# Patient Record
Sex: MALE | Race: WHITE | Employment: OTHER | URBAN - METROPOLITAN AREA
[De-identification: names, ages, dates, MRNs, and addresses within clinical notes are randomized per-mention and may not be internally consistent; named-entity substitution may affect disease eponyms.]

---

## 2017-10-16 ENCOUNTER — OFFICE VISIT (OUTPATIENT)
Dept: INTERNAL MEDICINE CLINIC | Age: 67
End: 2017-10-16

## 2017-10-16 ENCOUNTER — HOSPITAL ENCOUNTER (OUTPATIENT)
Dept: LAB | Age: 67
Discharge: HOME | End: 2017-10-16
Payer: MEDICARE

## 2017-10-16 VITALS
DIASTOLIC BLOOD PRESSURE: 93 MMHG | TEMPERATURE: 97.4 F | RESPIRATION RATE: 17 BRPM | BODY MASS INDEX: 29.34 KG/M2 | WEIGHT: 221.4 LBS | OXYGEN SATURATION: 96 % | SYSTOLIC BLOOD PRESSURE: 155 MMHG | HEART RATE: 59 BPM | HEIGHT: 73 IN

## 2017-10-16 DIAGNOSIS — J30.89 CHRONIC NON-SEASONAL ALLERGIC RHINITIS, UNSPECIFIED TRIGGER: ICD-10-CM

## 2017-10-16 DIAGNOSIS — K21.9 GASTROESOPHAGEAL REFLUX DISEASE, ESOPHAGITIS PRESENCE NOT SPECIFIED: ICD-10-CM

## 2017-10-16 DIAGNOSIS — Z23 ENCOUNTER FOR IMMUNIZATION: ICD-10-CM

## 2017-10-16 DIAGNOSIS — E78.00 PURE HYPERCHOLESTEROLEMIA: ICD-10-CM

## 2017-10-16 DIAGNOSIS — H04.129 DRY EYE: ICD-10-CM

## 2017-10-16 DIAGNOSIS — I10 ESSENTIAL HYPERTENSION: Primary | ICD-10-CM

## 2017-10-16 PROCEDURE — 80053 COMPREHEN METABOLIC PANEL: CPT

## 2017-10-16 PROCEDURE — 80061 LIPID PANEL: CPT

## 2017-10-16 RX ORDER — CARBOXYMETHYLCELLULOSE SODIUM 10 MG/ML
GEL OPHTHALMIC
COMMUNITY
End: 2020-12-04 | Stop reason: ALTCHOICE

## 2017-10-16 RX ORDER — ZOLPIDEM TARTRATE 10 MG/1
5 TABLET ORAL
COMMUNITY
End: 2017-12-18 | Stop reason: DRUGHIGH

## 2017-10-16 RX ORDER — FLUTICASONE PROPIONATE 50 MCG
2 SPRAY, SUSPENSION (ML) NASAL DAILY
COMMUNITY
End: 2020-04-30 | Stop reason: ALTCHOICE

## 2017-10-16 RX ORDER — DEXLANSOPRAZOLE 60 MG/1
CAPSULE, DELAYED RELEASE ORAL
COMMUNITY
End: 2017-12-18 | Stop reason: SDUPTHER

## 2017-10-16 RX ORDER — LORATADINE 10 MG/1
10 TABLET ORAL
COMMUNITY
End: 2019-12-11 | Stop reason: ALTCHOICE

## 2017-10-16 RX ORDER — DIPHENHYDRAMINE HCL 25 MG
25 CAPSULE ORAL
COMMUNITY
End: 2018-11-30 | Stop reason: ALTCHOICE

## 2017-10-16 RX ORDER — SERTRALINE HYDROCHLORIDE 100 MG/1
TABLET, FILM COATED ORAL DAILY
COMMUNITY
End: 2017-10-16 | Stop reason: SDUPTHER

## 2017-10-16 RX ORDER — ATENOLOL 50 MG/1
TABLET ORAL DAILY
COMMUNITY
End: 2017-10-16 | Stop reason: SDUPTHER

## 2017-10-16 RX ORDER — DESOXIMETASONE 2.5 MG/G
CREAM TOPICAL 2 TIMES DAILY
COMMUNITY

## 2017-10-16 RX ORDER — SERTRALINE HYDROCHLORIDE 100 MG/1
100 TABLET, FILM COATED ORAL DAILY
Qty: 90 TAB | Refills: 3 | Status: SHIPPED | OUTPATIENT
Start: 2017-10-16 | End: 2017-12-18 | Stop reason: DRUGHIGH

## 2017-10-16 RX ORDER — METRONIDAZOLE 10 MG/G
GEL TOPICAL DAILY
COMMUNITY

## 2017-10-16 RX ORDER — ATORVASTATIN CALCIUM 10 MG/1
10 TABLET, FILM COATED ORAL DAILY
Qty: 90 TAB | Refills: 3 | Status: SHIPPED | OUTPATIENT
Start: 2017-10-16 | End: 2018-12-20 | Stop reason: SDUPTHER

## 2017-10-16 RX ORDER — CLINDAMYCIN PHOSPHATE 10 MG/ML
SOLUTION TOPICAL
COMMUNITY

## 2017-10-16 RX ORDER — LANOLIN ALCOHOL/MO/W.PET/CERES
1000 CREAM (GRAM) TOPICAL DAILY
COMMUNITY

## 2017-10-16 RX ORDER — ATENOLOL 50 MG/1
50 TABLET ORAL DAILY
Qty: 90 TAB | Refills: 3 | Status: SHIPPED | OUTPATIENT
Start: 2017-10-16 | End: 2017-10-17 | Stop reason: RX

## 2017-10-16 RX ORDER — ATORVASTATIN CALCIUM 10 MG/1
TABLET, FILM COATED ORAL DAILY
COMMUNITY
End: 2017-10-16 | Stop reason: SDUPTHER

## 2017-10-16 RX ORDER — AMLODIPINE BESYLATE 5 MG/1
5 TABLET ORAL DAILY
COMMUNITY
End: 2017-10-16 | Stop reason: DRUGHIGH

## 2017-10-16 RX ORDER — AMLODIPINE BESYLATE 10 MG/1
10 TABLET ORAL DAILY
Qty: 90 TAB | Refills: 3 | Status: SHIPPED | OUTPATIENT
Start: 2017-10-16 | End: 2017-12-18 | Stop reason: DRUGHIGH

## 2017-10-16 NOTE — MR AVS SNAPSHOT
Visit Information Date & Time Provider Department Dept. Phone Encounter #  
 10/16/2017 10:40 AM [address], MD Formerly [facility] [phone] [id number] Follow-up Instructions Return in about 8 weeks (around 12/11/2017) for htn. Upcoming Health Maintenance Date Due Hepatitis C Screening 1950 DTaP/Tdap/Td series (1 - Tdap) 9/14/1970 FOBT Q 1 YEAR AGE 50-75 9/14/1999 ZOSTER VACCINE AGE 60> 7/14/2009 GLAUCOMA SCREENING Q2Y 9/14/2014 Pneumococcal 65+ Low/Medium Risk (1 of 2 - PCV13) 9/14/2014 MEDICARE YEARLY EXAM 9/14/2014 INFLUENZA AGE 9 TO ADULT 8/1/2017 Allergies as of 10/16/2017  Review Complete On: 10/16/2017 By: [address], [place]  
 No Known Allergies Current Immunizations  Never Reviewed Name Date Influenza High Dose Vaccine PF  Incomplete Not reviewed this visit You Were Diagnosed With   
  
 Codes Comments Essential hypertension    -  Primary ICD-10-CM: I10 
ICD-9-CM: 401.9 Pure hypercholesterolemia     ICD-10-CM: E78.00 ICD-9-CM: 272.0 Encounter for immunization     ICD-10-CM: F00 ICD-9-CM: V03.89 Vitals BP Pulse Temp Resp Height(growth percentile) Weight(growth percentile) (!) 155/93 (BP 1 Location: Right arm, BP Patient Position: Sitting) (!) 59 97.4 °F (36.3 °C) (Oral) 17 6' 1\" (1.854 m) 221 lb 6.4 oz (100.4 kg) SpO2 BMI Smoking Status 96% 29.21 kg/m2 Never Smoker BMI and BSA Data Body Mass Index Body Surface Area  
 29.21 kg/m 2 2.27 m 2 Preferred Pharmacy Pharmacy Name Phone [address], [address] [facility] [address] [phone] Your Updated Medication List  
  
   
This list is accurate as of: 10/16/17 11:32 AM.  Always use your most recent med list. amLODIPine 10 mg tablet Commonly known as:  Alvarado Uribe Take 1 Tab by mouth daily. atenolol 50 mg tablet Commonly known as:  TENORMIN Take 1 Tab by mouth daily. atorvastatin 10 mg tablet Commonly known as:  LIPITOR Take 1 Tab by mouth daily. BENADRYL 25 mg capsule Generic drug:  diphenhydrAMINE Take 25 mg by mouth every six (6) hours as needed. CALCIUM 600 + D 600-125 mg-unit Tab Generic drug:  calcium-cholecalciferol (d3) Take  by mouth. CLARITIN 10 mg tablet Generic drug:  loratadine Take 10 mg by mouth. clindamycin phosphate 1 % Swab  
by Apply Externally route. cyanocobalamin 1,000 mcg tablet Take 1,000 mcg by mouth daily. desoximetasone 0.25 % topical cream  
Commonly known as:  TOPICORT Apply  to affected area two (2) times a day. DEXILANT 60 mg Cpdb Generic drug:  Dexlansoprazole Take  by mouth. fluticasone 50 mcg/actuation nasal spray Commonly known as:  Lella Solum 2 Sprays by Both Nostrils route daily. METROGEL 1 % topical gel Generic drug:  metroNIDAZOLE Apply  to affected area daily. Use a thin layer to affected areas after washing  
  
 multivitamin, tx-iron-ca-min 9 mg iron-400 mcg Tab tablet Commonly known as:  THERA-M w/ IRON Take 1 Tab by mouth daily. REFRESH LIQUIGEL 1 % Dlgl Generic drug:  carboxymethylcellulose sodium Apply  to eye.  
  
 sertraline 100 mg tablet Commonly known as:  ZOLOFT Take 1 Tab by mouth daily. zolpidem 10 mg tablet Commonly known as:  AMBIEN Take  by mouth nightly as needed for Sleep. Prescriptions Sent to Pharmacy Refills  
 amLODIPine (NORVASC) 10 mg tablet 3 Sig: Take 1 Tab by mouth daily. Class: Normal  
 [facility]: [facility] [address] [place] [address] [facility] Ph #: [phone] Route: Oral  
 atorvastatin (LIPITOR) 10 mg tablet 3 Sig: Take 1 Tab by mouth daily.   
 Class: Normal  
 Pharmacy: [address] [place], [place] [address]Willi [facility] AT [facility] Ph #: [phone] Route: Oral  
 sertraline (ZOLOFT) 100 mg tablet 3 Sig: Take 1 Tab by mouth daily. Class: Normal  
 Pharmacy: [address] [facility] Ph #: [phone] Route: Oral  
 atenolol (TENORMIN) 50 mg tablet 3 Sig: Take 1 Tab by mouth daily. Class: Normal  
 Pharmacy: [address] [facility] Ph #: [phone] Route: Oral  
  
We Performed the Following ADMIN PNEUMOCOCCAL VACCINE [ HCPCS] INFLUENZA VIRUS VACCINE, HIGH DOSE SEASONAL, PRESERVATIVE FREE [61775 CPT(R)] LIPID PANEL [20659 CPT(R)] METABOLIC PANEL, COMPREHENSIVE [51240 CPT(R)] Follow-up Instructions Return in about 8 weeks (around 12/11/2017) for htn. Introducing [facility]! [employer] introduces ShotSpotter patient portal. Now you can access parts of your medical record, email your doctor's office, and request medication refills online. 1. In your internet browser, go to [email or web address] 2. Click on the First Time User? Click Here link in the Sign In box. You will see the New Member Sign Up page. 3. Enter your ShotSpotter Access Code exactly as it appears below. You will not need to use this code after youve completed the sign-up process. If you do not sign up before the expiration date, you must request a new code. · ShotSpotter Access Code: [email or web address] Expires: 1/14/2018 10:25 AM 
 
4. Enter the last four digits of your Social Security Number (xxxx) and Date of Birth (mm/dd/yyyy) as indicated and click Submit. You will be taken to the next sign-up page. 5. Create a Roundarcht ID. This will be your ShotSpotter login ID and cannot be changed, so think of one that is secure and easy to remember. 6. Create a Roundarcht password. You can change your password at any time. 7. Enter your Password Reset Question and Answer. This can be used at a later time if you forget your password. 8. Enter your e-mail address. You will receive e-mail notification when new information is available in [address] 9. Click Sign Up. You can now view and download portions of your medical record. 10. Click the Download Summary menu link to download a portable copy of your medical information. If you have questions, please visit the Frequently Asked Questions section of the Groovideo website. Remember, Groovideo is NOT to be used for urgent needs. For medical emergencies, dial 911. Now available from your iPhone and Android! Please provide this summary of care documentation to your next provider. Your primary care clinician is listed as BARBARA MARLOW. If you have any questions after today's visit, please call [phone].

## 2017-10-16 NOTE — PROGRESS NOTES
HISTORY OF PRESENT ILLNESS  Zena Montalvo is a 75 y.o. male. HPI  New patient to our practice, goes by \"Slate\". Lived in [place] but moved to [place] in 2005. Has just moved back full time to [place] but also had a house in [place] and in [place]. Lindsay Hoang - still working but has cut back. 2 children in [place] and 1 in [place]. Previous PCP was Dr. Lanre Gordon. Hx of htn and hyperlipidemia. BP was elevated today - tends to be higher lately. SBP running 150's. Watches salt intake, exercises \"little bit\", and takes his medications regularly. Feels may have gained weight but does not weigh himself at home. Last labs completed in March. Hx of GERD, placed on Dexilant by previous PCP several years ago. Wonders if still needs it. Denies any GERD. Would like to stops. Allergies well controlled with current medications. Started on sertraline by Dr. Lanre Gordon. Taking Ambien every night. Tends to have several drinks after dinner. Would like to stop. Feels contributing to weight gain and sleep issues. Past Medical History:   Diagnosis Date    Allergic rhinitis     Dry eye     GERD (gastroesophageal reflux disease)     Hypercholesterolemia     Hypertension        Past Surgical History:   Procedure Laterality Date    HX PROSTATECTOMY  2006    HX TONSILLECTOMY       No Known Allergies      Current Outpatient Prescriptions:     Dexlansoprazole (DEXILANT) 60 mg CpDB, Take  by mouth., Disp: , Rfl:     amLODIPine (NORVASC) 5 mg tablet, Take 5 mg by mouth daily. , Disp: , Rfl:     atorvastatin (LIPITOR) 10 mg tablet, Take  by mouth daily. , Disp: , Rfl:     sertraline (ZOLOFT) 100 mg tablet, Take  by mouth daily. , Disp: , Rfl:     atenolol (TENORMIN) 50 mg tablet, Take  by mouth daily. , Disp: , Rfl:     fluticasone (FLONASE) 50 mcg/actuation nasal spray, 2 Sprays by Both Nostrils route daily. , Disp: , Rfl:     multivitamin, tx-iron-ca-min (THERA-M W/ IRON) 9 mg iron-400 mcg tab tablet, Take 1 Tab by mouth daily. , Disp: , Rfl:     cyanocobalamin 1,000 mcg tablet, Take 1,000 mcg by mouth daily. , Disp: , Rfl:     calcium-cholecalciferol, d3, (CALCIUM 600 + D) 600-125 mg-unit tab, Take  by mouth., Disp: , Rfl:     loratadine (CLARITIN) 10 mg tablet, Take 10 mg by mouth., Disp: , Rfl:     carboxymethylcellulose sodium (REFRESH LIQUIGEL) 1 % dlgl, Apply  to eye., Disp: , Rfl:     diphenhydrAMINE (BENADRYL) 25 mg capsule, Take 25 mg by mouth every six (6) hours as needed. , Disp: , Rfl:     metroNIDAZOLE (METROGEL) 1 % topical gel, Apply  to affected area daily. Use a thin layer to affected areas after washing, Disp: , Rfl:     clindamycin phosphate 1 % swab, by Apply Externally route., Disp: , Rfl:     desoximetasone (TOPICORT) 0.25 % topical cream, Apply  to affected area two (2) times a day., Disp: , Rfl:     zolpidem (AMBIEN) 10 mg tablet, Take  by mouth nightly as needed for Sleep., Disp: , Rfl:     Family History   Problem Relation Age of Onset   [facility] Arthritis-rheumatoid Father     Diabetes Paternal Grandfather      Social History     Social History    Marital status: UNKNOWN     Spouse name: N/A    Number of children: N/A    Years of education: N/A     Occupational History    Not on file. Social History Main Topics    Smoking status: Never Smoker    Smokeless tobacco: Never Used    Alcohol use Yes    Drug use: No    Sexual activity: Not on file     Other Topics Concern    Not on file     Social History Narrative    No narrative on file     ROS  See above  Physical Exam   Constitutional: He appears well-developed and well-nourished. HENT:   Head: Normocephalic and atraumatic. Neck: Neck supple. No thyromegaly present. Cardiovascular: Normal rate, regular rhythm and normal heart sounds. Exam reveals no gallop and no friction rub. No murmur heard. Pulmonary/Chest: Effort normal and breath sounds normal.   Abdominal: Soft.  Bowel sounds are normal. He exhibits no distension and no mass. There is no tenderness. Musculoskeletal: He exhibits no edema. Lymphadenopathy:     He has no cervical adenopathy. Vitals reviewed. ASSESSMENT and PLAN  htn - not at goal.  Has not been at goal at home as well. Will increase dose of Amlodipine to 10mg every day. Continue same metoprolol dosing. Warned of leg swelling. Cut back on salt. Hyperlipidemia - controlled in past.  Repeat labs  GERD - will wean of dexilant - change to every other day dosing x 2 weeks then stop  Dry eyes - controlled, cont same  Allergic rhinitis -= controlled, cont same  Insomnia - cut back on post dinner etoh. Discussed weaning from [place] and sertraline in the future. Hx of prostate cancer - followed with yearly psa - all have been undetectable.     Orders Placed This Encounter    INFLUENZA VIRUS VACCINE, HIGH DOSE SEASONAL, PRESERVATIVE FREE    METABOLIC PANEL, COMPREHENSIVE    LIPID PANEL    Dexlansoprazole (DEXILANT) 60 mg CpDB    DISCONTD: amLODIPine (NORVASC) 5 mg tablet    DISCONTD: atorvastatin (LIPITOR) 10 mg tablet    DISCONTD: sertraline (ZOLOFT) 100 mg tablet    DISCONTD: atenolol (TENORMIN) 50 mg tablet    fluticasone (FLONASE) 50 mcg/actuation nasal spray    multivitamin, tx-iron-ca-min (THERA-M W/ IRON) 9 mg iron-400 mcg tab tablet    cyanocobalamin 1,000 mcg tablet    calcium-cholecalciferol, d3, (CALCIUM 600 + D) 600-125 mg-unit tab    loratadine (CLARITIN) 10 mg tablet    carboxymethylcellulose sodium (REFRESH LIQUIGEL) 1 % dlgl    diphenhydrAMINE (BENADRYL) 25 mg capsule    metroNIDAZOLE (METROGEL) 1 % topical gel    clindamycin phosphate 1 % swab    desoximetasone (TOPICORT) 0.25 % topical cream    zolpidem (AMBIEN) 10 mg tablet    amLODIPine (NORVASC) 10 mg tablet    atorvastatin (LIPITOR) 10 mg tablet    sertraline (ZOLOFT) 100 mg tablet    atenolol (TENORMIN) 50 mg tablet     Follow-up Disposition:  Return in about 8 weeks (around 12/11/2017) for htn.

## 2017-10-16 NOTE — PROGRESS NOTES
Chief Complaint   Patient [facility] with   [facility].

## 2017-10-17 LAB
ALBUMIN SERPL-MCNC: 4.6 G/DL (ref 3.6–4.8)
ALBUMIN/GLOB SERPL: 1.9 {RATIO} (ref 1.2–2.2)
ALP SERPL-CCNC: 69 IU/L (ref 39–117)
ALT SERPL-CCNC: 32 IU/L (ref 0–44)
AST SERPL-CCNC: 32 IU/L (ref 0–40)
BILIRUB SERPL-MCNC: 0.5 MG/DL (ref 0–1.2)
BUN SERPL-MCNC: 12 MG/DL (ref 8–27)
BUN/CREAT SERPL: 16 (ref 10–24)
CALCIUM SERPL-MCNC: 9.2 MG/DL (ref 8.6–10.2)
CHLORIDE SERPL-SCNC: 99 MMOL/L (ref 96–106)
CHOLEST SERPL-MCNC: 228 MG/DL (ref 100–199)
CO2 SERPL-SCNC: 27 MMOL/L (ref 18–29)
CREAT SERPL-MCNC: 0.75 MG/DL (ref 0.76–1.27)
GLOBULIN SER CALC-MCNC: 2.4 G/DL (ref 1.5–4.5)
GLUCOSE SERPL-MCNC: 98 MG/DL (ref 65–99)
HDLC SERPL-MCNC: 75 MG/DL
INTERPRETATION, 910389: NORMAL
LDLC SERPL CALC-MCNC: 125 MG/DL (ref 0–99)
POTASSIUM SERPL-SCNC: 4.6 MMOL/L (ref 3.5–5.2)
PROT SERPL-MCNC: 7 G/DL (ref 6–8.5)
SODIUM SERPL-SCNC: 141 MMOL/L (ref 134–144)
TRIGL SERPL-MCNC: 138 MG/DL (ref 0–149)
VLDLC SERPL CALC-MCNC: 28 MG/DL (ref 5–40)

## 2017-10-30 ENCOUNTER — TELEPHONE (OUTPATIENT)
Dept: INTERNAL MEDICINE CLINIC | Age: 67
End: 2017-10-30

## 2017-10-30 RX ORDER — HYDROCHLOROTHIAZIDE 25 MG/1
25 TABLET ORAL DAILY
Qty: 30 TAB | Refills: 6 | Status: SHIPPED | OUTPATIENT
Start: 2017-10-30 | End: 2018-05-10 | Stop reason: SDUPTHER

## 2017-10-30 NOTE — TELEPHONE ENCOUNTER
----- Message from Sal Diallo sent at 10/30/2017  7:40 AM EDT -----  Regarding: FW: Non-Urgent Medical Question  Contact: [phone]      ----- Message -----     From: Windy Carl     Sent: 10/29/2017  11:13 AM       To: [facility] Nurse Pool  Subject: Non-Urgent Medical Question                      Dr. Higinio Morelos:  My ankles have been slightly swollen recently. I wonder whether this may due to new blood pressure medicine. Please advise.   Thanks,  Sandor Mo

## 2017-12-18 ENCOUNTER — OFFICE VISIT (OUTPATIENT)
Dept: INTERNAL MEDICINE CLINIC | Age: 67
End: 2017-12-18

## 2017-12-18 VITALS
SYSTOLIC BLOOD PRESSURE: 147 MMHG | HEIGHT: 73 IN | DIASTOLIC BLOOD PRESSURE: 91 MMHG | WEIGHT: 219 LBS | OXYGEN SATURATION: 96 % | RESPIRATION RATE: 15 BRPM | HEART RATE: 75 BPM | BODY MASS INDEX: 29.03 KG/M2 | TEMPERATURE: 98 F

## 2017-12-18 DIAGNOSIS — F51.01 PRIMARY INSOMNIA: ICD-10-CM

## 2017-12-18 DIAGNOSIS — K21.9 GASTROESOPHAGEAL REFLUX DISEASE, ESOPHAGITIS PRESENCE NOT SPECIFIED: ICD-10-CM

## 2017-12-18 DIAGNOSIS — I10 ESSENTIAL HYPERTENSION: Primary | ICD-10-CM

## 2017-12-18 DIAGNOSIS — E78.00 PURE HYPERCHOLESTEROLEMIA: ICD-10-CM

## 2017-12-18 RX ORDER — AMLODIPINE BESYLATE 5 MG/1
5 TABLET ORAL DAILY
Qty: 30 TAB | Refills: 11 | Status: SHIPPED | OUTPATIENT
Start: 2017-12-18 | End: 2018-11-30 | Stop reason: ALTCHOICE

## 2017-12-18 RX ORDER — SERTRALINE HYDROCHLORIDE 50 MG/1
50 TABLET, FILM COATED ORAL DAILY
Qty: 30 TAB | Refills: 11 | Status: SHIPPED | OUTPATIENT
Start: 2017-12-18 | End: 2017-12-18 | Stop reason: SDUPTHER

## 2017-12-18 RX ORDER — ZOLPIDEM TARTRATE 5 MG/1
5 TABLET ORAL
Qty: 30 TAB | Refills: 5 | Status: SHIPPED | OUTPATIENT
Start: 2017-12-18 | End: 2018-11-30 | Stop reason: SDUPTHER

## 2017-12-18 RX ORDER — METOPROLOL SUCCINATE 100 MG/1
100 TABLET, EXTENDED RELEASE ORAL DAILY
Qty: 30 TAB | Refills: 11 | Status: SHIPPED | OUTPATIENT
Start: 2017-12-18 | End: 2017-12-18 | Stop reason: SDUPTHER

## 2017-12-18 RX ORDER — DEXLANSOPRAZOLE 60 MG/1
60 CAPSULE, DELAYED RELEASE ORAL
Qty: 15 CAP | Refills: 11 | Status: SHIPPED | OUTPATIENT
Start: 2017-12-18 | End: 2019-03-12 | Stop reason: SDUPTHER

## 2017-12-18 NOTE — MR AVS SNAPSHOT
Visit Information Date & Time Provider Department Dept. Phone Encounter #  
 12/18/2017 12:20 Rosemarie Dickson MD [facility] [phone] [id number] Follow-up Instructions Return in about 4 months (around 4/18/2018) for htn. Upcoming Health Maintenance Date Due Hepatitis C Screening 1950 DTaP/Tdap/Td series (1 - Tdap) 9/14/1970 FOBT Q 1 YEAR AGE 50-75 9/14/1999 ZOSTER VACCINE AGE 60> 7/14/2009 GLAUCOMA SCREENING Q2Y 9/14/2014 Pneumococcal 65+ Low/Medium Risk (1 of 2 - PCV13) 9/14/2014 MEDICARE YEARLY EXAM 9/14/2014 Allergies as of 12/18/2017  Review Complete On: 12/18/2017 By: Sommer Chavez MD  
 No Known Allergies Current Immunizations  Reviewed on 10/16/2017 Name Date Influenza High Dose Vaccine PF 10/16/2017 Not reviewed this visit You Were Diagnosed With   
  
 Codes Comments Essential hypertension    -  Primary ICD-10-CM: I10 
ICD-9-CM: 401.9 Pure hypercholesterolemia     ICD-10-CM: E78.00 ICD-9-CM: 272.0 Gastroesophageal reflux disease, esophagitis presence not specified     ICD-10-CM: K21.9 ICD-9-CM: 530.81 Vitals BP Pulse Temp Resp Height(growth percentile) Weight(growth percentile) (!) 147/91 (BP 1 Location: Left arm, BP Patient Position: Sitting) 75 98 °F (36.7 °C) (Oral) 15 6' 1\" (1.854 m) 219 lb (99.3 kg) SpO2 BMI Smoking Status 96% 28.89 kg/m2 Never Smoker Vitals History BMI and BSA Data Body Mass Index Body Surface Area  
 28.89 kg/m 2 2.26 m 2 Preferred Pharmacy Pharmacy Name Phone [address], [facility] [facility] [address] [phone] Your Updated Medication List  
  
   
This list is accurate as of: 12/18/17  1:14 PM.  Always use your most recent med list. amLODIPine 5 mg tablet Commonly known as:  Martha Johnston Take 1 Tab by mouth daily. atorvastatin 10 mg tablet Commonly known as:  LIPITOR Take 1 Tab by mouth daily. BENADRYL 25 mg capsule Generic drug:  diphenhydrAMINE Take 25 mg by mouth every six (6) hours as needed. CALCIUM 600 + D 600-125 mg-unit Tab Generic drug:  calcium-cholecalciferol (d3) Take  by mouth. CLARITIN 10 mg tablet Generic drug:  loratadine Take 10 mg by mouth. clindamycin phosphate 1 % Swab  
by Apply Externally route. cyanocobalamin 1,000 mcg tablet Take 1,000 mcg by mouth daily. desoximetasone 0.25 % topical cream  
Commonly known as:  TOPICORT Apply  to affected area two (2) times a day. Dexlansoprazole 60 mg Cpdb Commonly known as:  DEXILANT Take 1 Cap by mouth every fourty-eight (48) hours. fluticasone 50 mcg/actuation nasal spray Commonly known as:  [place] 2 Sprays by Both Nostrils route daily. GLUCOSAMINE HCL PO Take  by mouth. hydroCHLOROthiazide 25 mg tablet Commonly known as:  HYDRODIURIL Take 1 Tab by mouth daily. metoprolol succinate 100 mg tablet Commonly known as:  TOPROL XL Take 1 Tab by mouth daily. METROGEL 1 % topical gel Generic drug:  metroNIDAZOLE Apply  to affected area daily. Use a thin layer to affected areas after washing  
  
 multivitamin, tx-iron-ca-min 9 mg iron-400 mcg Tab tablet Commonly known as:  THERA-M w/ IRON Take 1 Tab by mouth daily. REFRESH LIQUIGEL 1 % Dlgl Generic drug:  carboxymethylcellulose sodium Apply  to eye.  
  
 sertraline 50 mg tablet Commonly known as:  ZOLOFT Take 1 Tab by mouth daily. zolpidem 5 mg tablet Commonly known as:  AMBIEN Take 1 Tab by mouth nightly as needed for Sleep. Max Daily Amount: 5 mg. Prescriptions Printed Refills  
 zolpidem (AMBIEN) 5 mg tablet 5 Sig: Take 1 Tab by mouth nightly as needed for Sleep. Max Daily Amount: 5 mg. Class: Print  Route: Oral  
  
 Prescriptions Sent to Pharmacy Refills  
 amLODIPine (NORVASC) 5 mg tablet 11 Sig: Take 1 Tab by mouth daily. Class: Normal  
 Pharmacy: [address] [facility] Ph #: [phone] Route: Oral  
 metoprolol succinate (TOPROL XL) 100 mg tablet 11 Sig: Take 1 Tab by mouth daily. Class: Normal  
 Pharmacy: [address] [facility] Ph #: [phone] Route: Oral  
 Dexlansoprazole (DEXILANT) 60 mg CpDB 11 Sig: Take 1 Cap by mouth every fourty-eight (48) hours. Class: Normal  
 Pharmacy: [address] [facility] Ph #: [phone] Route: Oral  
 sertraline (ZOLOFT) 50 mg tablet 11 Sig: Take 1 Tab by mouth daily. Class: Normal  
 Pharmacy: [address] [facility] Ph #: [phone] Route: Oral  
  
Follow-up Instructions Return in about 4 months (around 4/18/2018) for htn. Patient Instructions Increase your Metoprolol to 100mg daily. Send me your blood pressure readings in the beginning of January and we can adjust your medications. Introducing [facility]! Dear Michaela Price: Thank you for requesting a Gradeable account. Our records indicate that you already have an active Gradeable account. You can access your account anytime at [email or web address] Did you know that you can access your hospital and ER discharge instructions at any time in Gradeable? You can also review all of your test results from your hospital stay or ER visit. Additional Information If you have questions, please visit the Frequently Asked Questions section of the Gradeable website at [email or web address] Remember, Gradeable is NOT to be used for urgent needs.  For medical emergencies, dial 911. Now available from your iPhone and Android! Please provide this summary of care documentation to your next provider. Your primary care clinician is listed as BARBARA MARLOW. If you have any questions after today's visit, please call [phone].

## 2017-12-18 NOTE — PATIENT INSTRUCTIONS
Increase your Metoprolol to 100mg daily. Send me your blood pressure readings in the beginning of January and we can adjust your medications.

## 2017-12-18 NOTE — PROGRESS NOTES
Subjective:     Len Caro is a 75 y.o. male who presents for follow up of hypertension. Last visit bp was elevated. Increased amlodipine to 10mg but had ankle swelling but decreased back to 5mg and added hctz. Also had to change from atenolol to metoprolol secondary to  shortage. Diet and Lifestyle: generally follows a low sodium diet, exercises sporadically - walks around [employer]. Home BP Monitoring: is not measured at home    Cardiovascular ROS: taking medications as instructed, no medication side effects noted, no TIA's, no chest pain on exertion, no dyspnea on exertion, no swelling of ankles, no orthostatic dizziness or lightheadedness, no palpitations. New concerns:   Going to [place] early Jan to mid April. Will be more active in [place]. Luli López GERD - cut back on the Dexilant to every other day and then stopped. Now with return of acid indigestion. Has to watch what he eats. Current Outpatient Prescriptions   Medication Sig Dispense Refill    GLUCOSAMINE HCL PO Take  by mouth.  metoprolol succinate (TOPROL-XL) 50 mg XL tablet TAKE 1 TABLET BY MOUTH DAILY 30 Tab 11    hydroCHLOROthiazide (HYDRODIURIL) 25 mg tablet Take 1 Tab by mouth daily. 30 Tab 6    fluticasone (FLONASE) 50 mcg/actuation nasal spray 2 Sprays by Both Nostrils route daily.  multivitamin, tx-iron-ca-min (THERA-M W/ IRON) 9 mg iron-400 mcg tab tablet Take 1 Tab by mouth daily.  cyanocobalamin 1,000 mcg tablet Take 1,000 mcg by mouth daily.  calcium-cholecalciferol, d3, (CALCIUM 600 + D) 600-125 mg-unit tab Take  by mouth.  loratadine (CLARITIN) 10 mg tablet Take 10 mg by mouth.  carboxymethylcellulose sodium (REFRESH LIQUIGEL) 1 % dlgl Apply  to eye.  diphenhydrAMINE (BENADRYL) 25 mg capsule Take 25 mg by mouth every six (6) hours as needed.  metroNIDAZOLE (METROGEL) 1 % topical gel Apply  to affected area daily.  Use a thin layer to affected areas after washing      clindamycin phosphate 1 % swab by Apply Externally route.  desoximetasone (TOPICORT) 0.25 % topical cream Apply  to affected area two (2) times a day.  zolpidem (AMBIEN) 10 mg tablet Take 5 mg by mouth nightly as needed for Sleep.  amLODIPine (NORVASC) 10 mg tablet Take 1 Tab by mouth daily. (Patient taking differently: Take 5 mg by mouth daily.) 90 Tab 3    atorvastatin (LIPITOR) 10 mg tablet Take 1 Tab by mouth daily. 90 Tab 3    sertraline (ZOLOFT) 100 mg tablet Take 1 Tab by mouth daily. 90 Tab 3    Dexlansoprazole (DEXILANT) 60 mg CpDB Take  by mouth. Lab Results  Component Value Date/Time   Cholesterol, total 228 10/16/2017 12:00 AM   HDL Cholesterol 75 10/16/2017 12:00 AM   LDL, calculated 125 10/16/2017 12:00 AM   Triglyceride 138 10/16/2017 12:00 AM     Lab Results  Component Value Date/Time   ALT (SGPT) 32 10/16/2017 12:00 AM   AST (SGOT) 32 10/16/2017 12:00 AM   Alk. phosphatase 69 10/16/2017 12:00 AM   Bilirubin, total 0.5 10/16/2017 12:00 AM   Albumin 4.6 10/16/2017 12:00 AM   Protein, total 7.0 10/16/2017 12:00 AM       Lab Results  Component Value Date/Time   GFR est non-AA 94 10/16/2017 12:00 AM   GFR est  10/16/2017 12:00 AM   Creatinine 0.75 10/16/2017 12:00 AM   BUN 12 10/16/2017 12:00 AM   Sodium 141 10/16/2017 12:00 AM   Potassium 4.6 10/16/2017 12:00 AM   Chloride 99 10/16/2017 12:00 AM   CO2 27 10/16/2017 12:00 AM              Review of Systems, additional:  Pertinent items are noted in HPI. Objective:   Visit Vitals    BP (!) 147/91 (BP 1 Location: Left arm, BP Patient Position: Sitting)    Pulse 75    Temp 98 °F (36.7 °C) (Oral)    Resp 15    Ht 6' 1\" (1.854 m)    Wt 219 lb (99.3 kg)    SpO2 96%    BMI 28.89 kg/m2     Appearance: alert, well appearing, and in no distress and oriented to person, place, and time. General exam:   . NECK: supple, no lad, no bruit, no tm  LUNGS: cta bilat  CV rrr, no m/g/r  ABD: soft, nt, nd, nabs  EXT: no c/c/e  Lab review: kidney function is normal.  .     Assessment/Plan:     hypertension borderline controlled. the following changes are made - increase Toprol XL to 100mg qd.  continue Amlodipine 5mg and hctz. Going to [place] the week after xmas. Will contact my via [facility] with BP's in January. GERD - restart Dexilant for every other day    Ambien - doing fine on 5mg every day. Would like to ocntinue on this dose. Depression - has been on Sertraline for years. No depression symptoms. On 100mg, would like to decrease to 50mg. Orders Placed This Encounter    GLUCOSAMINE HCL PO    amLODIPine (NORVASC) 5 mg tablet    metoprolol succinate (TOPROL XL) 100 mg tablet    Dexlansoprazole (DEXILANT) 60 mg CpDB    sertraline (ZOLOFT) 50 mg tablet    zolpidem (AMBIEN) 5 mg tablet     Follow-up Disposition:  Return in about 4 months (around 4/18/2018) for htn.

## 2018-02-12 ENCOUNTER — PATIENT MESSAGE (OUTPATIENT)
Dept: INTERNAL MEDICINE CLINIC | Age: 68
End: 2018-02-12

## 2018-02-14 ENCOUNTER — TELEPHONE (OUTPATIENT)
Dept: INTERNAL MEDICINE CLINIC | Age: 68
End: 2018-02-14

## 2018-02-14 RX ORDER — METOPROLOL SUCCINATE 100 MG/1
150 TABLET, EXTENDED RELEASE ORAL DAILY
Qty: 135 TAB | Refills: 3 | Status: SHIPPED | OUTPATIENT
Start: 2018-02-14 | End: 2018-05-11 | Stop reason: SDUPTHER

## 2018-02-14 NOTE — TELEPHONE ENCOUNTER
----- Message from Gillian Simmons sent at 2/14/2018  8:04 AM EST -----  Regarding: FW: Non-Urgent Medical Question  Contact: [phone]      ----- Message -----     From: Norberto Schafer     Sent: 2/13/2018   6:02 PM       To: Clay Benavides  Subject: RE: Non-Urgent Medical Question                  Yes, I am taking it. Pulse is 71.  ----- Message -----  From: Elsa Robertson MD  Sent: 2/13/2018  5:47 PM EST  To: Norberto Schafer  Subject: RE: Non-Urgent Medical Question    Are you still taking your hydrochlorothiazide as well? What is your pulse? I would like to increase your metoprolol but I do not want to decrease your heart rate too low. Dr. Rhonda Wilcox    ----- Message -----     From: Norberto Schafer     Sent: 2/12/2018  5:39 PM EST       To: Elsa Robertson MD  Subject: Non-Urgent Medical Question    Dr. Rhonda Wilocx:  My BP has been running in the 130-140/82-90 range. I am taking 5MG amlodipine and 100 MG of metoprolol. Please advise if any adjustment should be made.   Thanks,  Sandor Mo

## 2018-02-19 NOTE — TELEPHONE ENCOUNTER
Sabiha Polo LPN 3/91/1353 3:07 AM EST        ----- Message -----   From: Jeramie Johnson   Sent: 2/17/2018 10:22 AM   To: Jorge Benavides  Subject: RE: Non-Urgent Medical Question     Dr. Mc Alicia:     I am in [facility] and [facility] seems to have lost the new prescription in its system. Can you resubmit to [facility] [place] [facility] at [address] [place] [address] [phone]. Thanks, Elisha Zuleta  ----- Message -----  From: Gelacio Bragg MD  Sent: 2/14/2018 8:38 AM EST  To: Jeramie Johnson  Subject: RE: Non-Urgent Medical Question    Please increase your Metoprolol to 1.5 tablets daily. They should be scored so you can cut or break them in half. Let me know your blood pressure and pulse in 2 weeks. I have sent a new prescription to your pharmacy for the increased amt of Metoprolol.     ----- Message -----   From: Jeramie Johnson   Sent: 2/13/2018 6:02 PM EST   To: Gelacio Bragg MD  Subject: RE: Non-Urgent Medical Question    Yes, I am taking it. Pulse is 71.  ----- Message -----  From: Gelacio Bragg MD  Sent: 2/13/2018 5:47 PM EST  To: Jeramie Johnson  Subject: RE: Non-Urgent Medical Question    Are you still taking your hydrochlorothiazide as well? What is your pulse? I would like to increase your metoprolol but I do not want to decrease your heart rate too low. Dr. Mc Alicia    ----- Message -----   From: Jeramie Johnson   Sent: 2/12/2018 5:39 PM EST   To: Gelacio Bragg MD  Subject: Non-Urgent Medical Question    Dr. Mc Alicia: My BP has been running in the 130-140/82-90 range. I am taking 5MG amlodipine and 100 MG of metoprolol. Please advise if any adjustment should be made.  Thanks, Elisha [employer]

## 2018-02-20 RX ORDER — METOPROLOL SUCCINATE 100 MG/1
150 TABLET, EXTENDED RELEASE ORAL DAILY
Qty: 135 TAB | Refills: 1 | Status: SHIPPED | OUTPATIENT
Start: 2018-02-20 | End: 2018-05-11 | Stop reason: ALTCHOICE

## 2018-03-20 ENCOUNTER — TELEPHONE (OUTPATIENT)
Dept: INTERNAL MEDICINE CLINIC | Age: 68
End: 2018-03-20

## 2018-03-20 RX ORDER — LISINOPRIL 10 MG/1
10 TABLET ORAL DAILY
Qty: 30 TAB | Refills: 5 | Status: SHIPPED | OUTPATIENT
Start: 2018-03-20 | End: 2018-03-20 | Stop reason: SDUPTHER

## 2018-03-20 NOTE — TELEPHONE ENCOUNTER
----- Message from Nelly Go sent at 3/19/2018  8:40 AM EDT -----  Regarding: FW: Non-Urgent Medical Question  Contact: [phone]      ----- Message -----     From: Puma Monreal     Sent: 3/18/2018   5:21 PM       To:  [facility] Nurse Pool  Subject: Non-Urgent Medical Question                      Dr. Carissa Varela:  Some more readings.   Tammy Tadeo    3/2   143/83/64  3/4       140/80/58  3/5.   140/85/66  3/10. 135/82/72   3/13   135/83/62  3/14. 134/84/62  3/18  136/86/61

## 2018-05-11 ENCOUNTER — OFFICE VISIT (OUTPATIENT)
Dept: INTERNAL MEDICINE CLINIC | Age: 68
End: 2018-05-11

## 2018-05-11 VITALS
HEART RATE: 59 BPM | TEMPERATURE: 98.2 F | WEIGHT: 220.6 LBS | RESPIRATION RATE: 17 BRPM | HEIGHT: 73 IN | SYSTOLIC BLOOD PRESSURE: 133 MMHG | DIASTOLIC BLOOD PRESSURE: 75 MMHG | OXYGEN SATURATION: 95 % | BODY MASS INDEX: 29.24 KG/M2

## 2018-05-11 DIAGNOSIS — Z00.00 MEDICARE ANNUAL WELLNESS VISIT, SUBSEQUENT: ICD-10-CM

## 2018-05-11 DIAGNOSIS — E78.00 PURE HYPERCHOLESTEROLEMIA: ICD-10-CM

## 2018-05-11 DIAGNOSIS — I10 ESSENTIAL HYPERTENSION: Primary | ICD-10-CM

## 2018-05-11 DIAGNOSIS — Z11.59 ENCOUNTER FOR HEPATITIS C SCREENING TEST FOR LOW RISK PATIENT: ICD-10-CM

## 2018-05-11 DIAGNOSIS — Z71.89 ADVANCE DIRECTIVE DISCUSSED WITH PATIENT: ICD-10-CM

## 2018-05-11 DIAGNOSIS — E66.3 OVERWEIGHT (BMI 25.0-29.9): ICD-10-CM

## 2018-05-11 RX ORDER — METOPROLOL SUCCINATE 100 MG/1
200 TABLET, EXTENDED RELEASE ORAL DAILY
Qty: 180 TAB | Refills: 3 | Status: SHIPPED | OUTPATIENT
Start: 2018-05-11 | End: 2018-08-26 | Stop reason: SDUPTHER

## 2018-05-11 NOTE — PROGRESS NOTES
Chief Complaint   Patient presents with    Follow Up Chronic Condition     HTN     1. Have you been to the ER, urgent care clinic since your last visit? Hospitalized since your last visit? No    2. Have you seen or consulted any other health care providers outside of the [address] [place] [address] since your last visit? Include any pap smears or colon screening.  No

## 2018-05-11 NOTE — PATIENT INSTRUCTIONS
Medicare Wellness Visit, Male    The best way to live healthy is to have a healthy lifestyle by eating a well-balanced diet, exercising regularly, limiting alcohol and stopping smoking. Regular physical exams and screening tests are another way to keep healthy. Preventive exams provided by your health care provider can find health problems before they become diseases or illnesses. Preventive services including immunizations, screening tests, monitoring and exams can help you take care of your own health. All people over age 72 should have a pneumovax  and and a prevnar shot to prevent pneumonia. These are once in a lifetime unless you and your provider decide differently. All people over 65 should have a yearly flu shot and a tetanus vaccine every 10 years. Screening for diabetes mellitus with a blood sugar test should be done every year. Glaucoma is a disease of the eye due to increased ocular pressure that can lead to blindness and it should be done every year by an eye professional.    Cardiovascular screening tests that check for elevated lipids (fatty part of blood) which can lead to heart disease and strokes should be done every 5 years. Colorectal screening that evaluates for blood or polyps in your colon should be done yearly as a stool test or every five years as a flexible sigmoidoscope or every 10 years as a colonoscopy up to age 76. Men up to age 76 may need a screening blood test for prostate cancer at certain intervals, depending on their personal and family history. This decision is between the patient and his provider. If you have been a smoker or had family history of abdominal aortic aneurysms, you and your provider may decide to schedule an ultrasound test of your aorta. Hepatitis C screening is also recommended for anyone born between [birth year] through [address]. A shingles vaccine is also recommended once in a lifetime after age 61.     Your Medicare Wellness Exam is recommended annually.     Here is a list of your current Health Maintenance items with a due date:    Health Maintenance Due   Topic Date Due    Hepatitis C Screening  Ordered today    COLONOSCOPY  Up to date September 2019    DTaP/Tdap/Td series (1 - Tdap) 09/14/1970    ZOSTER VACCINE AGE 60>  Ordered today    GLAUCOMA SCREENING Q2Y  Up to date    Pneumococcal 65+ Low/Medium Risk (1 of 2 - PCV13) Ordered today    MEDICARE YEARLY EXAM  05/11/2019

## 2018-05-11 NOTE — MR AVS SNAPSHOT
[address] [place] [address] [address] [facility] 
[phone] Patient: Cooper Garcia MRN: [id number] AVH:[id number][id number] Visit Information Date & Time Provider Department Dept. Phone Encounter #  
 5/11/2018 11:40 AM Mary Kate Angel MD [facility] [phone] [id number] Follow-up Instructions Return in about 6 months (around 11/11/2018) for htn, hyperlipidemia. Your Appointments 11/30/2018 10:00 AM  
ROUTINE CARE with Mary Kate Angel MD  
[facility]) [facility] [address] [address] Upcoming Health Maintenance Date Due Hepatitis C Screening 1950 COLONOSCOPY 9/14/1967 DTaP/Tdap/Td series (1 - Tdap) 9/14/1970 ZOSTER VACCINE AGE 60> 7/14/2009 GLAUCOMA SCREENING Q2Y 9/14/2014 Pneumococcal 65+ Low/Medium Risk (1 of 2 - PCV13) 9/14/2014 MEDICARE YEARLY EXAM 3/14/2018 Influenza Age 5 to Adult 8/1/2018 Allergies as of 5/11/2018  Review Complete On: 5/11/2018 By: Mary Kate Angel MD  
 No Known Allergies Current Immunizations  Reviewed on 10/16/2017 Name Date Influenza High Dose Vaccine PF 10/16/2017 Not reviewed this visit You Were Diagnosed With   
  
 Codes Comments Essential hypertension    -  Primary ICD-10-CM: I10 
ICD-9-CM: 401.9 Medicare annual wellness visit, subsequent     ICD-10-CM: Z00.00 ICD-9-CM: V70.0 Pure hypercholesterolemia     ICD-10-CM: E78.00 ICD-9-CM: 272.0 Encounter for hepatitis C screening test for low risk patient     ICD-10-CM: Z11.59 
ICD-9-CM: V73.89 Advance directive discussed with patient     ICD-10-CM: Z71.89 ICD-9-CM: V65.49 Vitals BP Pulse Temp Resp Height(growth percentile) Weight(growth percentile) 133/75 (BP 1 Location: Left arm, BP Patient Position: Sitting) (!) 59 98.2 °F (36.8 °C) (Oral) 17 6' 1\" (1.854 m) 220 lb 9.6 oz (100.1 kg) SpO2 BMI Smoking Status 95% 29.1 kg/m2 Never Smoker Vitals History BMI and BSA Data Body Mass Index Body Surface Area  
 29.1 kg/m 2 2.27 m 2 Preferred Pharmacy Pharmacy Name Phone [address], [address] [facility] [address] [phone] Your Updated Medication List  
  
   
This list is accurate as of 5/11/18 12:16 PM.  Always use your most recent med list. amLODIPine 5 mg tablet Commonly known as:  Keri Pall Take 1 Tab by mouth daily. atorvastatin 10 mg tablet Commonly known as:  LIPITOR Take 1 Tab by mouth daily. BENADRYL 25 mg capsule Generic drug:  diphenhydrAMINE Take 25 mg by mouth every six (6) hours as needed. CLARITIN 10 mg tablet Generic drug:  loratadine Take 10 mg by mouth. clindamycin phosphate 1 % Swab  
by Apply Externally route. cyanocobalamin 1,000 mcg tablet Take 1,000 mcg by mouth daily. desoximetasone 0.25 % topical cream  
Commonly known as:  TOPICORT Apply  to affected area two (2) times a day. Dexlansoprazole 60 mg Cpdb Commonly known as:  DEXILANT Take 1 Cap by mouth every fourty-eight (48) hours. fluticasone 50 mcg/actuation nasal spray Commonly known as:  Genie You 2 Sprays by Both Nostrils route daily. GLUCOSAMINE HCL PO Take  by mouth. hydroCHLOROthiazide 25 mg tablet Commonly known as:  HYDRODIURIL Take 1 Tab by mouth daily. lisinopril 10 mg tablet Commonly known as:  PRINIVIL, ZESTRIL  
TAKE 1 TABLET BY MOUTH DAILY  
  
 metoprolol succinate 100 mg tablet Commonly known as:  TOPROL-XL Take 1.5 Tabs by mouth daily. METROGEL 1 % topical gel Generic drug:  metroNIDAZOLE  
 Apply  to affected area daily. Use a thin layer to affected areas after washing  
  
 multivitamin, tx-iron-ca-min 9 mg iron-400 mcg Tab tablet Commonly known as:  THERA-M w/ IRON Take 1 Tab by mouth daily. pneumococcal 13 kirk conj dip 0.5 mL Syrg injection Commonly known as:  PREVNAR 13 (PF)  
0.5 mL by IntraMUSCular route once for 1 dose. REFRESH LIQUIGEL 1 % Dlgl Generic drug:  carboxymethylcellulose sodium Apply  to eye.  
  
 sertraline 50 mg tablet Commonly known as:  ZOLOFT  
TAKE 1 TABLET BY MOUTH DAILY  
  
 varicella-zoster recombinant (PF) 50 mcg/0.5 mL Susr injection Commonly known as:  SHINGRIX (PF)  
0.5 mL by IntraMUSCular route once for 1 dose. zolpidem 5 mg tablet Commonly known as:  AMBIEN Take 1 Tab by mouth nightly as needed for Sleep. Max Daily Amount: 5 mg. Prescriptions Printed Refills  
 varicella-zoster recombinant, PF, (SHINGRIX, PF,) 50 mcg/0.5 mL susr injection 1 Si.5 mL by IntraMUSCular route once for 1 dose. Class: Print Route: IntraMUSCular  
 pneumococcal 13 kirk conj dip (PREVNAR 13, PF,) 0.5 mL syrg injection 0 Si.5 mL by IntraMUSCular route once for 1 dose. Class: Print Route: IntraMUSCular We Performed the Following HEPATIC FUNCTION PANEL [11956 CPT(R)] HEPATITIS C AB [02946 CPT(R)] LIPID PANEL [01864 CPT(R)] Follow-up Instructions Return in about 6 months (around 2018) for htn, hyperlipidemia. Patient Instructions Medicare Wellness Visit, Male The best way to live healthy is to have a healthy lifestyle by eating a well-balanced diet, exercising regularly, limiting alcohol and stopping smoking. Regular physical exams and screening tests are another way to keep healthy. Preventive exams provided by your health care provider can find health problems before they become diseases or illnesses.  Preventive services including immunizations, screening tests, monitoring and exams can help you take care of your own health. All people over age 72 should have a pneumovax  and and a prevnar shot to prevent pneumonia. These are once in a lifetime unless you and your provider decide differently. All people over 65 should have a yearly flu shot and a tetanus vaccine every 10 years. Screening for diabetes mellitus with a blood sugar test should be done every year. Glaucoma is a disease of the eye due to increased ocular pressure that can lead to blindness and it should be done every year by an eye professional. 
 
Cardiovascular screening tests that check for elevated lipids (fatty part of blood) which can lead to heart disease and strokes should be done every 5 years. Colorectal screening that evaluates for blood or polyps in your colon should be done yearly as a stool test or every five years as a flexible sigmoidoscope or every 10 years as a colonoscopy up to age 76. Men up to age 76 may need a screening blood test for prostate cancer at certain intervals, depending on their personal and family history. This decision is between the patient and his provider. If you have been a smoker or had family history of abdominal aortic aneurysms, you and your provider may decide to schedule an ultrasound test of your aorta. Hepatitis C screening is also recommended for anyone born between [birth year] through [address]. A shingles vaccine is also recommended once in a lifetime after age 61. Your Medicare Wellness Exam is recommended annually. Here is a list of your current Health Maintenance items with a due date: 
 
Health Maintenance Due Topic Date Due  
 Hepatitis C Screening  Ordered today  COLONOSCOPY  Up to date September 2019  
 DTaP/Tdap/Td series (1 - Tdap) 09/14/1970  ZOSTER VACCINE AGE 60>  Ordered today  GLAUCOMA SCREENING Q2Y  Up to date  Pneumococcal 65+ Low/Medium Risk (1 of 2 - PCV13) Ordered today  MEDICARE YEARLY EXAM  05/11/2019 Introducing [facility] SERVICES! Dear Pete Carr: Thank you for requesting a [employer] account. Our records indicate that you already have an active [employer] account. You can access your account anytime at [email or web address] Did you know that you can access your hospital and ER discharge instructions at any time in [employer]? You can also review all of your test results from your hospital stay or ER visit. Additional Information If you have questions, please visit the Frequently Asked Questions section of the [employer] website at [email or web address] Remember, [employer] is NOT to be used for urgent needs. For medical emergencies, dial 911. Now available from your iPhone and Android! Please provide this summary of care documentation to your next provider. Your primary care clinician is listed as BARBARA MARLOW. If you have any questions after today's visit, please call [phone].

## 2018-05-11 NOTE — PROGRESS NOTES
Subjective:     Giorgi Rodríguez is a 75 y.o. male who presents for follow up of hypertension and hyperlipidemia. Diet and Lifestyle: generally follows a low sodium diet, exercises regularly - walking and playing golf  Home BP Monitoring: is not measured at home    Cardiovascular ROS: taking medications as instructed, no medication side effects noted, no TIA's, no chest pain on exertion, no dyspnea on exertion, no swelling of ankles, no orthostatic dizziness or lightheadedness, no palpitations. New concerns: none. Living in [place] for > 6 months (winter). .     Current Outpatient Prescriptions   Medication Sig Dispense Refill    varicella-zoster recombinant, PF, (SHINGRIX, PF,) 50 mcg/0.5 mL susr injection 0.5 mL by IntraMUSCular route once for 1 dose. 1 Each 1    pneumococcal 13 kirk conj dip (PREVNAR 13, PF,) 0.5 mL syrg injection 0.5 mL by IntraMUSCular route once for 1 dose. 0.5 mL 0    lisinopril (PRINIVIL, ZESTRIL) 10 mg tablet TAKE 1 TABLET BY MOUTH DAILY 90 Tab 3    metoprolol succinate (TOPROL-XL) 100 mg tablet Take 1.5 Tabs by mouth daily. (Patient taking differently: Take 150 mg by mouth two (2) times a day.) 135 Tab 3    GLUCOSAMINE HCL PO Take  by mouth.  amLODIPine (NORVASC) 5 mg tablet Take 1 Tab by mouth daily. 30 Tab 11    Dexlansoprazole (DEXILANT) 60 mg CpDB Take 1 Cap by mouth every fourty-eight (48) hours. 15 Cap 11    zolpidem (AMBIEN) 5 mg tablet Take 1 Tab by mouth nightly as needed for Sleep. Max Daily Amount: 5 mg. 30 Tab 5    sertraline (ZOLOFT) 50 mg tablet TAKE 1 TABLET BY MOUTH DAILY 90 Tab 3    hydroCHLOROthiazide (HYDRODIURIL) 25 mg tablet Take 1 Tab by mouth daily. 30 Tab 6    fluticasone (FLONASE) 50 mcg/actuation nasal spray 2 Sprays by Both Nostrils route daily.  multivitamin, tx-iron-ca-min (THERA-M W/ IRON) 9 mg iron-400 mcg tab tablet Take 1 Tab by mouth daily.  cyanocobalamin 1,000 mcg tablet Take 1,000 mcg by mouth daily.       loratadine (CLARITIN) 10 mg tablet Take 10 mg by mouth.  carboxymethylcellulose sodium (REFRESH LIQUIGEL) 1 % dlgl Apply  to eye.  diphenhydrAMINE (BENADRYL) 25 mg capsule Take 25 mg by mouth every six (6) hours as needed.  metroNIDAZOLE (METROGEL) 1 % topical gel Apply  to affected area daily. Use a thin layer to affected areas after washing      clindamycin phosphate 1 % swab by Apply Externally route.  desoximetasone (TOPICORT) 0.25 % topical cream Apply  to affected area two (2) times a day.  atorvastatin (LIPITOR) 10 mg tablet Take 1 Tab by mouth daily. 90 Tab 3        Lab Results   Component Value Date/Time    Cholesterol, total 228 (H) 10/16/2017 12:00 AM    HDL Cholesterol 75 10/16/2017 12:00 AM    LDL, calculated 125 (H) 10/16/2017 12:00 AM    Triglyceride 138 10/16/2017 12:00 AM     Lab Results   Component Value Date/Time    ALT (SGPT) 32 10/16/2017 12:00 AM    AST (SGOT) 32 10/16/2017 12:00 AM    Alk. phosphatase 69 10/16/2017 12:00 AM    Bilirubin, total 0.5 10/16/2017 12:00 AM    Albumin 4.6 10/16/2017 12:00 AM    Protein, total 7.0 10/16/2017 12:00 AM       Lab Results   Component Value Date/Time    GFR est non-AA 94 10/16/2017 12:00 AM    GFR est  10/16/2017 12:00 AM    Creatinine 0.75 (L) 10/16/2017 12:00 AM    BUN 12 10/16/2017 12:00 AM    Sodium 141 10/16/2017 12:00 AM    Potassium 4.6 10/16/2017 12:00 AM    Chloride 99 10/16/2017 12:00 AM    CO2 27 10/16/2017 12:00 AM        Review of Systems, additional:  Pertinent items are noted in HPI. Objective:     Visit Vitals    /75 (BP 1 Location: Left arm, BP Patient Position: Sitting)    Pulse (!) 59    Temp 98.2 °F (36.8 °C) (Oral)    Resp 17    Ht 6' 1\" (1.854 m)    Wt 220 lb 9.6 oz (100.1 kg)    SpO2 95%    BMI 29.1 kg/m2     Appearance: alert, well appearing, and in no distress and oriented to person, place, and time. General exam:   . NECK: supple, no lad, no bruit, no tm  LUNGS: cta bilat  CV rrr, no m/g/r  ABD: soft, nt, nd, nabs  EXT: no c/c/e      Assessment/Plan:     hypertension well controlled. current treatment plan is effective, no change in therapy. Hyperlipidemia - controlled, cont same  Check labs    Orders Placed This Encounter    LIPID PANEL    HEPATIC FUNCTION PANEL    HEPATITIS C AB    varicella-zoster recombinant, PF, (SHINGRIX, PF,) 50 mcg/0.5 mL susr injection    pneumococcal 13 kirk conj dip (PREVNAR 13, PF,) 0.5 mL syrg injection     Follow-up Disposition:  Return in about 6 months (around 11/11/2018) for htn, hyperlipidemia. This is the Subsequent Medicare Annual Wellness Exam, performed 12 months or more after the Initial AWV or the last Subsequent AWV    I have reviewed the patient's medical history in detail and updated the computerized patient record. History     Past Medical History:   Diagnosis Date    Allergic rhinitis     Cancer ([facility] [address]     prostate ca - diagnosed age 53    Dry eye     GERD (gastroesophageal reflux disease)     Hypercholesterolemia     Hypertension       Past Surgical History:   Procedure Laterality Date    HX PROSTATECTOMY  2006    HX TONSILLECTOMY       Current Outpatient Prescriptions   Medication Sig Dispense Refill    varicella-zoster recombinant, PF, (SHINGRIX, PF,) 50 mcg/0.5 mL susr injection 0.5 mL by IntraMUSCular route once for 1 dose. 1 Each 1    pneumococcal 13 kirk conj dip (PREVNAR 13, PF,) 0.5 mL syrg injection 0.5 mL by IntraMUSCular route once for 1 dose. 0.5 mL 0    lisinopril (PRINIVIL, ZESTRIL) 10 mg tablet TAKE 1 TABLET BY MOUTH DAILY 90 Tab 3    metoprolol succinate (TOPROL-XL) 100 mg tablet Take 1.5 Tabs by mouth daily. (Patient taking differently: Take 150 mg by mouth two (2) times a day.) 135 Tab 3    GLUCOSAMINE HCL PO Take  by mouth.  amLODIPine (NORVASC) 5 mg tablet Take 1 Tab by mouth daily. 30 Tab 11    Dexlansoprazole (DEXILANT) 60 mg CpDB Take 1 Cap by mouth every fourty-eight (48) hours.  15 Cap 11    zolpidem (AMBIEN) 5 mg tablet Take 1 Tab by mouth nightly as needed for Sleep. Max Daily Amount: 5 mg. 30 Tab 5    sertraline (ZOLOFT) 50 mg tablet TAKE 1 TABLET BY MOUTH DAILY 90 Tab 3    hydroCHLOROthiazide (HYDRODIURIL) 25 mg tablet Take 1 Tab by mouth daily. 30 Tab 6    fluticasone (FLONASE) 50 mcg/actuation nasal spray 2 Sprays by Both Nostrils route daily.  multivitamin, tx-iron-ca-min (THERA-M W/ IRON) 9 mg iron-400 mcg tab tablet Take 1 Tab by mouth daily.  cyanocobalamin 1,000 mcg tablet Take 1,000 mcg by mouth daily.  loratadine (CLARITIN) 10 mg tablet Take 10 mg by mouth.  carboxymethylcellulose sodium (REFRESH LIQUIGEL) 1 % dlgl Apply  to eye.  diphenhydrAMINE (BENADRYL) 25 mg capsule Take 25 mg by mouth every six (6) hours as needed.  metroNIDAZOLE (METROGEL) 1 % topical gel Apply  to affected area daily. Use a thin layer to affected areas after washing      clindamycin phosphate 1 % swab by Apply Externally route.  desoximetasone (TOPICORT) 0.25 % topical cream Apply  to affected area two (2) times a day.  atorvastatin (LIPITOR) 10 mg tablet Take 1 Tab by mouth daily.  80 Tab 3     No Known Allergies  Family History   Problem Relation Age of Onset   23 [facility] Arthritis-rheumatoid Father     Cancer Father      prostate ca    Diabetes Paternal Grandfather      Social History   Substance Use Topics    Smoking status: Never Smoker    Smokeless tobacco: Never Used    Alcohol use Yes     Patient Active Problem List   Diagnosis Code    Dry eye N78.540    Allergic rhinitis J30.9    Essential hypertension I10    Pure hypercholesterolemia E78.00    Gastroesophageal reflux disease K21.9    Advance directive discussed with patient Z71.89       Depression Risk Factor Screening:     PHQ over the last two weeks 10/16/2017   Little interest or pleasure in doing things Not at all   Feeling down, depressed or hopeless Not at all   Total Score PHQ 2 0     Alcohol Risk Factor Screening: You average more than 14 drinks a week. Functional Ability and Level of Safety:   Hearing Loss  Hearing is good. Activities of Daily Living  The home contains: no safety equipment. Patient does total self care    Fall Risk  Fall Risk Assessment, last 12 mths 10/16/2017   Able to walk? Yes   Fall in past 12 months? No       Abuse Screen  Patient is not abused    Cognitive Screening   Evaluation of Cognitive Function:  Has your family/caregiver stated any concerns about your memory: no  Normal    Patient Care Team   Patient Care Team:  Priyanka Kaba MD as PCP - General (Internal Medicine)    Assessment/Plan   Education and counseling provided:  Has an advanced directive. Diagnoses and all orders for this visit:    1. Essential hypertension    2. Medicare annual wellness visit, subsequent    3. Pure hypercholesterolemia  -     LIPID PANEL  -     HEPATIC FUNCTION PANEL    4. Encounter for hepatitis C screening test for low risk patient  -     HEPATITIS C AB    5. Advance directive discussed with patient    6. Overweight (BMI 25.0-29.9) - discussed diet and exericse for weight loss    Other orders  -     varicella-zoster recombinant, PF, (SHINGRIX, PF,) 50 mcg/0.5 mL susr injection; 0.5 mL by IntraMUSCular route once for 1 dose. -     pneumococcal 13 kirk conj dip (PREVNAR 13, PF,) 0.5 mL syrg injection; 0.5 mL by IntraMUSCular route once for 1 dose.         Health Maintenance Due   Topic Date Due    Hepatitis C Screening  Ordered today    COLONOSCOPY  Up to date September 2019    DTaP/Tdap/Td series (1 - Tdap) 09/14/1970    ZOSTER VACCINE AGE 60>  Ordered today    GLAUCOMA SCREENING Q2Y  Up to date    Pneumococcal 65+ Low/Medium Risk (1 of 2 - PCV13) Ordered today    MEDICARE YEARLY EXAM  05/11/2019

## 2018-05-15 ENCOUNTER — HOSPITAL ENCOUNTER (OUTPATIENT)
Dept: LAB | Age: 68
Discharge: HOME | End: 2018-05-15
Payer: MEDICARE

## 2018-05-15 PROCEDURE — 80061 LIPID PANEL: CPT

## 2018-05-15 PROCEDURE — 36415 COLL VENOUS BLD VENIPUNCTURE: CPT

## 2018-05-15 PROCEDURE — 80076 HEPATIC FUNCTION PANEL: CPT

## 2018-05-15 PROCEDURE — 86803 HEPATITIS C AB TEST: CPT

## 2018-05-16 LAB
ALBUMIN SERPL-MCNC: 4.6 G/DL (ref 3.6–4.8)
ALP SERPL-CCNC: 71 IU/L (ref 39–117)
ALT SERPL-CCNC: 27 IU/L (ref 0–44)
AST SERPL-CCNC: 23 IU/L (ref 0–40)
BILIRUB DIRECT SERPL-MCNC: 0.17 MG/DL (ref 0–0.4)
BILIRUB SERPL-MCNC: 0.5 MG/DL (ref 0–1.2)
CHOLEST SERPL-MCNC: 214 MG/DL (ref 100–199)
HCV AB S/CO SERPL IA: <0.1 S/CO RATIO (ref 0–0.9)
HDLC SERPL-MCNC: 62 MG/DL
INTERPRETATION, 910389: NORMAL
LDLC SERPL CALC-MCNC: 119 MG/DL (ref 0–99)
PROT SERPL-MCNC: 6.8 G/DL (ref 6–8.5)
TRIGL SERPL-MCNC: 164 MG/DL (ref 0–149)
VLDLC SERPL CALC-MCNC: 33 MG/DL (ref 5–40)

## 2018-06-07 ENCOUNTER — TELEPHONE (OUTPATIENT)
Dept: INTERNAL MEDICINE CLINIC | Age: 68
End: 2018-06-07

## 2018-06-07 NOTE — TELEPHONE ENCOUNTER
Brittany Bustamante 24 minutes ago (11:50 AM)     Pt is returning call from the office regarding recommendations for bronchitis     Best contact # [phone]

## 2018-06-07 NOTE — TELEPHONE ENCOUNTER
Pt is returning call from the office regarding recommendations for bronchitis     Best contact # [phone]

## 2018-06-07 NOTE — TELEPHONE ENCOUNTER
Plain mucinex 1200mg bid (not D or DM) and saline nasal spray 2 squirts each nostril 2-3 times daily.

## 2018-06-07 NOTE — TELEPHONE ENCOUNTER
Spoke to patient and notified him of Dr. Jasmeet Saleh recommendations. Patient verbalized understanding.

## 2018-06-08 ENCOUNTER — OFFICE VISIT (OUTPATIENT)
Dept: INTERNAL MEDICINE CLINIC | Age: 68
End: 2018-06-08

## 2018-06-08 VITALS
HEART RATE: 60 BPM | SYSTOLIC BLOOD PRESSURE: 134 MMHG | DIASTOLIC BLOOD PRESSURE: 67 MMHG | WEIGHT: 222.6 LBS | BODY MASS INDEX: 29.5 KG/M2 | OXYGEN SATURATION: 97 % | TEMPERATURE: 98.3 F | HEIGHT: 73 IN

## 2018-06-08 DIAGNOSIS — J20.9 ACUTE BRONCHITIS, UNSPECIFIED ORGANISM: Primary | ICD-10-CM

## 2018-06-08 RX ORDER — METOPROLOL SUCCINATE 50 MG/1
TABLET, EXTENDED RELEASE ORAL
Refills: 5 | COMMUNITY
Start: 2018-06-02 | End: 2018-06-08 | Stop reason: SDUPTHER

## 2018-06-08 RX ORDER — ZOLPIDEM TARTRATE 10 MG/1
TABLET ORAL
Refills: 5 | COMMUNITY
Start: 2018-06-03 | End: 2018-06-08 | Stop reason: SDUPTHER

## 2018-06-08 RX ORDER — DOXYCYCLINE 100 MG/1
100 TABLET ORAL 2 TIMES DAILY
Qty: 20 TAB | Refills: 0 | Status: SHIPPED | OUTPATIENT
Start: 2018-06-08 | End: 2018-06-18

## 2018-06-08 NOTE — MR AVS SNAPSHOT
[address] [place] [address] [address] [facility] 
[phone] Patient: Jaiden Atkins MRN: [id number] Visit Information Date & Time Provider Department Dept. Phone Encounter #  
 6/8/2018  8:40 AM Miracle Lee MD [facility] [phone] [id number] Follow-up Instructions Return if symptoms worsen or fail to improve. Follow-up and Disposition History Your Appointments 11/30/2018 10:00 AM  
ROUTINE CARE with Miracle Lee MD  
[facility] [address]) Appt Note: R/F  
 [place] [address] [facility] [id number] [place] [address] 66. [address] 7 [address] Upcoming Health Maintenance Date Due COLONOSCOPY 9/14/1967 DTaP/Tdap/Td series (1 - Tdap) 9/14/1970 ZOSTER VACCINE AGE 60> 7/14/2009 GLAUCOMA SCREENING Q2Y 9/14/2014 Pneumococcal 65+ Low/Medium Risk (1 of 2 - PCV13) 9/14/2014 Influenza Age 5 to Adult 8/1/2018 MEDICARE YEARLY EXAM 5/12/2019 Allergies as of 6/8/2018  Review Complete On: 6/8/2018 By: Miracle Lee MD  
 No Known Allergies Current Immunizations  Reviewed on 10/16/2017 Name Date Influenza High Dose Vaccine PF 10/16/2017 Not reviewed this visit You Were Diagnosed With   
  
 Codes Comments Acute bronchitis, unspecified organism    -  Primary ICD-10-CM: J20.9 ICD-9-CM: 466.0 Vitals BP Pulse Temp Height(growth percentile) Weight(growth percentile) SpO2  
 134/67 60 98.3 °F (36.8 °C) (Oral) 6' 1\" (1.854 m) 222 lb 9.6 oz (101 kg) 97% BMI Smoking Status 29.37 kg/m2 Never Smoker Vitals History BMI and BSA Data Body Mass Index Body Surface Area  
 29.37 kg/m 2 2.28 m 2 Preferred Pharmacy Pharmacy Name Phone  [id number] [facility],[address] AT [facility] [address] [phone] Your Updated Medication List  
  
   
This list is accurate as of 6/8/18  9:06 AM.  Always use your most recent med list. amLODIPine 5 mg tablet Commonly known as:  Fordyce Raddle Take 1 Tab by mouth daily. atorvastatin 10 mg tablet Commonly known as:  LIPITOR Take 1 Tab by mouth daily. BENADRYL 25 mg capsule Generic drug:  diphenhydrAMINE Take 25 mg by mouth every six (6) hours as needed. CLARITIN 10 mg tablet Generic drug:  loratadine Take 10 mg by mouth. clindamycin phosphate 1 % Swab  
by Apply Externally route. cyanocobalamin 1,000 mcg tablet Take 1,000 mcg by mouth daily. desoximetasone 0.25 % topical cream  
Commonly known as:  TOPICORT Apply  to affected area two (2) times a day. Dexlansoprazole 60 mg Cpdb Commonly known as:  DEXILANT Take 1 Cap by mouth every fourty-eight (48) hours. doxycycline 100 mg tablet Commonly known as:  ADOXA Take 1 Tab by mouth two (2) times a day for 10 days. fluticasone 50 mcg/actuation nasal spray Commonly known as:  Demond Calk 2 Sprays by Both Nostrils route daily. GLUCOSAMINE HCL PO Take  by mouth. hydroCHLOROthiazide 25 mg tablet Commonly known as:  HYDRODIURIL  
TAKE 1 TABLET BY MOUTH DAILY  
  
 lisinopril 10 mg tablet Commonly known as:  PRINIVIL, ZESTRIL  
TAKE 1 TABLET BY MOUTH DAILY  
  
 metoprolol succinate 100 mg tablet Commonly known as:  TOPROL-XL Take 2 Tabs by mouth daily. METROGEL 1 % topical gel Generic drug:  metroNIDAZOLE Apply  to affected area daily. Use a thin layer to affected areas after washing  
  
 multivitamin, tx-iron-ca-min 9 mg iron-400 mcg Tab tablet Commonly known as:  THERA-M w/ IRON Take 1 Tab by mouth daily. REFRESH LIQUIGEL 1 % Dlgl Generic drug:  carboxymethylcellulose sodium Apply  to eye.  
  
 sertraline 50 mg tablet Commonly known as:  ZOLOFT  
 TAKE 1 TABLET BY MOUTH DAILY  
  
 zolpidem 5 mg tablet Commonly known as:  AMBIEN Take 1 Tab by mouth nightly as needed for Sleep. Max Daily Amount: 5 mg. Prescriptions Sent to Pharmacy Refills  
 doxycycline (ADOXA) 100 mg tablet 0 Sig: Take 1 Tab by mouth two (2) times a day for 10 days. Class: Normal  
 Pharmacy: [facility] [address] [place] [address] Drive Patrick [address]att 148 Ph #: [phone] Route: Oral  
  
Follow-up Instructions Return if symptoms worsen or fail to improve. Introducing [facility]! Dear Lilliam Guerrero: Thank you for requesting a [employer] account. Our records indicate that you already have an active [employer] account. You can access your account anytime at [email or web address] Did you know that you can access your hospital and ER discharge instructions at any time in [employer]? You can also review all of your test results from your hospital stay or ER visit. Additional Information If you have questions, please visit the Frequently Asked Questions section of the [employer] website at [email or web address] Remember, [employer] is NOT to be used for urgent needs. For medical emergencies, dial 911. Now available from your iPhone and Android! Please provide this summary of care documentation to your next provider. Your primary care clinician is listed as BARBARA MARLOW. If you have any questions after today's visit, please call [phone].

## 2018-06-08 NOTE — PROGRESS NOTES
Chief Complaint   Patient presents with    Nasal Congestion     Day 3    Cough       1. Have you been to the ER, urgent care clinic since your last visit? Hospitalized since your last visit? No    2. Have you seen or consulted any other health care providers outside of the [address] [place] [address] since your last visit? Include any pap smears or colon screening.  No

## 2018-06-08 NOTE — PROGRESS NOTES
Subjective:   Giorgi Rodríguez is a 75 y.o. male who complains of chest congestion, nonproductive cough, sinus congestion, cough is making him nauseous for 3 days, gradually worsening since that time. Some wheezing. He denies a history of chills, fatigue and shortness of breath. Evaluation to date: none. Treatment to date: started mucinex and saline ns since yesterday. .  Patient does not smoke cigarettes. Relevant PMH: htn. Current Outpatient Prescriptions   Medication Sig Dispense Refill    hydroCHLOROthiazide (HYDRODIURIL) 25 mg tablet TAKE 1 TABLET BY MOUTH DAILY 30 Tab 11    metoprolol succinate (TOPROL-XL) 100 mg tablet Take 2 Tabs by mouth daily. 180 Tab 3    lisinopril (PRINIVIL, ZESTRIL) 10 mg tablet TAKE 1 TABLET BY MOUTH DAILY 90 Tab 3    GLUCOSAMINE HCL PO Take  by mouth.  amLODIPine (NORVASC) 5 mg tablet Take 1 Tab by mouth daily. 30 Tab 11    Dexlansoprazole (DEXILANT) 60 mg CpDB Take 1 Cap by mouth every fourty-eight (48) hours. 15 Cap 11    zolpidem (AMBIEN) 5 mg tablet Take 1 Tab by mouth nightly as needed for Sleep. Max Daily Amount: 5 mg. 30 Tab 5    sertraline (ZOLOFT) 50 mg tablet TAKE 1 TABLET BY MOUTH DAILY 90 Tab 3    fluticasone (FLONASE) 50 mcg/actuation nasal spray 2 Sprays by Both Nostrils route daily.  multivitamin, tx-iron-ca-min (THERA-M W/ IRON) 9 mg iron-400 mcg tab tablet Take 1 Tab by mouth daily.  cyanocobalamin 1,000 mcg tablet Take 1,000 mcg by mouth daily.  loratadine (CLARITIN) 10 mg tablet Take 10 mg by mouth.  carboxymethylcellulose sodium (REFRESH LIQUIGEL) 1 % dlgl Apply  to eye.  diphenhydrAMINE (BENADRYL) 25 mg capsule Take 25 mg by mouth every six (6) hours as needed.  metroNIDAZOLE (METROGEL) 1 % topical gel Apply  to affected area daily. Use a thin layer to affected areas after washing      clindamycin phosphate 1 % swab by Apply Externally route.       desoximetasone (TOPICORT) 0.25 % topical cream Apply  to affected area two (2) times a day.  atorvastatin (LIPITOR) 10 mg tablet Take 1 Tab by mouth daily. 90 Tab 3        Review of Systems  Pertinent items are noted in HPI. Objective:     Visit Vitals    /67    Pulse 60    Temp 98.3 °F (36.8 °C) (Oral)    Ht 6' 1\" (1.854 m)    Wt 222 lb 9.6 oz (101 kg)    SpO2 97%    BMI 29.37 kg/m2     General:  alert, cooperative, no distress   Eyes: conjunctivae/corneas clear. Ears: normal TM's and external ear canals AU   Sinuses: Normal paranasal sinuses without tenderness   Mouth:  abnormal findings: brownish coating to tongue, post nasal drainage   Neck: supple, symmetrical, trachea midline and no adenopathy. Lungs: clear to auscultation bilaterally, no egophany   Nares   Edematous with thick dark mucous        Assessment/Plan:   bronchitis  Suggested symptomatic OTC remedies. Nasal saline sprays for congestion. Antibiotics per orders. RTC prn. Continue mucinex  Orders Placed This Encounter    DISCONTD: zolpidem (AMBIEN) 10 mg tablet    DISCONTD: metoprolol succinate (TOPROL-XL) 50 mg XL tablet    doxycycline (ADOXA) 100 mg tablet     Follow-up Disposition: Not on File.

## 2018-08-27 RX ORDER — METOPROLOL SUCCINATE 100 MG/1
200 TABLET, EXTENDED RELEASE ORAL DAILY
Qty: 180 TAB | Refills: 3 | Status: SHIPPED | OUTPATIENT
Start: 2018-08-27 | End: 2019-10-05 | Stop reason: SDUPTHER

## 2018-08-27 NOTE — TELEPHONE ENCOUNTER
From: Thais Moura  To:  Pancho Hernandez MD  Sent: 8/26/2018 11:00 AM EDT  Subject: Medication Renewal Request    Original authorizing provider: MD Thais Hernandez would like a refill of the following medications:  metoprolol succinate (TOPROL-XL) 100 mg tablet Pancho Hernandez MD]    Preferred pharmacy: [address] [place] [address] [address] AT [place] [address]    Comment:

## 2018-11-30 ENCOUNTER — OFFICE VISIT (OUTPATIENT)
Dept: INTERNAL MEDICINE CLINIC | Age: 68
End: 2018-11-30

## 2018-11-30 VITALS
TEMPERATURE: 96.1 F | OXYGEN SATURATION: 97 % | DIASTOLIC BLOOD PRESSURE: 80 MMHG | SYSTOLIC BLOOD PRESSURE: 132 MMHG | RESPIRATION RATE: 16 BRPM | WEIGHT: 220 LBS | HEART RATE: 60 BPM | BODY MASS INDEX: 29.16 KG/M2 | HEIGHT: 73 IN

## 2018-11-30 DIAGNOSIS — E78.00 PURE HYPERCHOLESTEROLEMIA: ICD-10-CM

## 2018-11-30 DIAGNOSIS — E66.3 OVERWEIGHT (BMI 25.0-29.9): ICD-10-CM

## 2018-11-30 DIAGNOSIS — K21.9 GASTROESOPHAGEAL REFLUX DISEASE, ESOPHAGITIS PRESENCE NOT SPECIFIED: ICD-10-CM

## 2018-11-30 DIAGNOSIS — M54.2 NECK PAIN: ICD-10-CM

## 2018-11-30 DIAGNOSIS — G47.00 INSOMNIA, UNSPECIFIED TYPE: ICD-10-CM

## 2018-11-30 DIAGNOSIS — I10 ESSENTIAL HYPERTENSION: Primary | ICD-10-CM

## 2018-11-30 DIAGNOSIS — J30.89 NON-SEASONAL ALLERGIC RHINITIS, UNSPECIFIED TRIGGER: ICD-10-CM

## 2018-11-30 RX ORDER — ZOLPIDEM TARTRATE 5 MG/1
5 TABLET ORAL
Qty: 30 TAB | Refills: 5 | Status: SHIPPED | OUTPATIENT
Start: 2018-11-30 | End: 2019-01-06 | Stop reason: SDUPTHER

## 2018-11-30 RX ORDER — DICLOFENAC SODIUM 75 MG/1
75 TABLET, DELAYED RELEASE ORAL 2 TIMES DAILY
Qty: 60 TAB | Refills: 3 | Status: SHIPPED | OUTPATIENT
Start: 2018-11-30 | End: 2019-03-14 | Stop reason: SDUPTHER

## 2018-11-30 NOTE — PROGRESS NOTES
Subjective:  
 
Evelia Calderón is a 70 y.o. male who presents for follow up of hypertension and hyperlipidemia. Diet and Lifestyle: generally follows a low sodium diet, exercises regularly Home BP Monitoring: is not measured at home Cardiovascular ROS: taking medications as instructed, no medication side effects noted, no TIA's, no chest pain on exertion, no dyspnea on exertion, no swelling of ankles, no orthostatic dizziness or lightheadedness, no palpitations. New concerns:  
Continued sinus issues most of the year. Continues post nasal drainage. Taking 1 12 hour claritin D daily and Nyquil up to twice a day. Can trigger a cough. Mild hoarseness. When in [address] [place] [address] would have similar issues in fall. Was ENT who blamed on allergies. Taking flonase daily. Most of his life has gone through life slumped over. Hunched over desk and books most of life. Chronic pain in neck and shoulder which he would treat with ice, heat, Aleve and massages. All which helps . Taking posture classes now. Has tried PT in the past.  In past had some pain radiating to arm but not since stopped working. Current Outpatient Medications Medication Sig Dispense Refill  propylene glycol (SYSTANE COMPLETE) 0.6 % drop Apply  to eye.  amLODIPine (NORVASC) 10 mg tablet TAKE 1 TABLET BY MOUTH DAILY 90 Tab 3  
 metoprolol succinate (TOPROL-XL) 100 mg tablet Take 2 Tabs by mouth daily. 180 Tab 3  
 hydroCHLOROthiazide (HYDRODIURIL) 25 mg tablet TAKE 1 TABLET BY MOUTH DAILY 30 Tab 11  
 lisinopril (PRINIVIL, ZESTRIL) 10 mg tablet TAKE 1 TABLET BY MOUTH DAILY 90 Tab 3  
 GLUCOSAMINE HCL PO Take  by mouth.  Dexlansoprazole (DEXILANT) 60 mg CpDB Take 1 Cap by mouth every fourty-eight (48) hours. 15 Cap 11  
 zolpidem (AMBIEN) 5 mg tablet Take 1 Tab by mouth nightly as needed for Sleep. Max Daily Amount: 5 mg. 30 Tab 5  sertraline (ZOLOFT) 50 mg tablet TAKE 1 TABLET BY MOUTH DAILY 90 Tab 3  
 fluticasone (FLONASE) 50 mcg/actuation nasal spray 2 Sprays by Both Nostrils route daily.  multivitamin, tx-iron-ca-min (THERA-M W/ IRON) 9 mg iron-400 mcg tab tablet Take 1 Tab by mouth daily.  cyanocobalamin 1,000 mcg tablet Take 1,000 mcg by mouth daily.  loratadine (CLARITIN) 10 mg tablet Take 10 mg by mouth.  carboxymethylcellulose sodium (REFRESH LIQUIGEL) 1 % dlgl Apply  to eye.  metroNIDAZOLE (METROGEL) 1 % topical gel Apply  to affected area daily. Use a thin layer to affected areas after washing  clindamycin phosphate 1 % swab by Apply Externally route.  desoximetasone (TOPICORT) 0.25 % topical cream Apply  to affected area two (2) times a day.  atorvastatin (LIPITOR) 10 mg tablet Take 1 Tab by mouth daily. 90 Tab 3 Lab Results Component Value Date/Time Cholesterol, total 214 (H) 05/15/2018 09:02 AM  
 HDL Cholesterol 62 05/15/2018 09:02 AM  
 LDL, calculated 119 (H) 05/15/2018 09:02 AM  
 Triglyceride 164 (H) 05/15/2018 09:02 AM  
 
Lab Results Component Value Date/Time ALT (SGPT) 27 05/15/2018 09:02 AM  
 AST (SGOT) 23 05/15/2018 09:02 AM  
 Alk. phosphatase 71 05/15/2018 09:02 AM  
 Bilirubin, direct 0.17 05/15/2018 09:02 AM  
 Bilirubin, total 0.5 05/15/2018 09:02 AM  
 Albumin 4.6 05/15/2018 09:02 AM  
 Protein, total 6.8 05/15/2018 09:02 AM  
 
Lab Results Component Value Date/Time GFR est non-AA 94 10/16/2017 12:00 AM  
 GFR est  10/16/2017 12:00 AM  
 Creatinine 0.75 (L) 10/16/2017 12:00 AM  
 BUN 12 10/16/2017 12:00 AM  
 Sodium 141 10/16/2017 12:00 AM  
 Potassium 4.6 10/16/2017 12:00 AM  
 Chloride 99 10/16/2017 12:00 AM  
 CO2 27 10/16/2017 12:00 AM  
  
 
Review of Systems, additional: 
Pertinent items are noted in HPI. Objective:  
 
Visit Vitals /80 (BP 1 Location: Right arm, BP Patient Position: Sitting) Pulse 60 Temp 96.1 °F (35.6 °C) (Oral) Resp 16 Ht 6' 1\" (1.854 m) Wt 220 lb (99.8 kg) SpO2 97% BMI 29.03 kg/m² Appearance: alert, well appearing, and in no distress and oriented to person, place, and time. General exam: NECK: supple, no lad, no bruit, no tm LUNGS: cta bilat CV rrr, no m/g/r ABD: soft, nt, nd, nabs EXT: no c/c/e. Assessment/Plan:  
 
hypertension well controlled. current treatment plan is effective, no change in therapy. Hyperlipidemia - controlled Continue same Check labs GERD - controlled Continue same Neck strain - continue with stretching and posture exercises Diclofenac prn Allergic rhinitis- stop claritin. Start Zyrtec, cont Flonase. Saline ns prn. If continues, consider allergy referral.   
 
overweight - discussed diet and exercise for weight loss Orders Placed This Encounter  METABOLIC PANEL, COMPREHENSIVE  LIPID PANEL  propylene glycol (SYSTANE COMPLETE) 0.6 % drop  diclofenac EC (VOLTAREN) 75 mg EC tablet  zolpidem (AMBIEN) 5 mg tablet Follow-up Disposition: 
Return in about 6 months (around 5/30/2019) for hyperlipidemia, htn.

## 2018-11-30 NOTE — PROGRESS NOTES
1. Have you been to the ER, urgent care clinic since your last visit? Hospitalized since your last visit? No 
 
2. Have you seen or consulted any other health care providers outside of the [address] [place] [address] since your last visit? Include any pap smears or colon screening. Dr Wolfe-dermatology. Had basal cell carcinoma removed from forehead

## 2018-12-07 ENCOUNTER — HOSPITAL ENCOUNTER (OUTPATIENT)
Dept: LAB | Age: 68
Discharge: HOME | End: 2018-12-07
Payer: MEDICARE

## 2018-12-07 PROCEDURE — 36415 COLL VENOUS BLD VENIPUNCTURE: CPT

## 2018-12-07 PROCEDURE — 80053 COMPREHEN METABOLIC PANEL: CPT

## 2018-12-07 PROCEDURE — 80061 LIPID PANEL: CPT

## 2018-12-08 LAB
ALBUMIN SERPL-MCNC: 4.7 G/DL (ref 3.6–4.8)
ALBUMIN/GLOB SERPL: 2.1 {RATIO} (ref 1.2–2.2)
ALP SERPL-CCNC: 64 IU/L (ref 39–117)
ALT SERPL-CCNC: 26 IU/L (ref 0–44)
AST SERPL-CCNC: 29 IU/L (ref 0–40)
BILIRUB SERPL-MCNC: 0.4 MG/DL (ref 0–1.2)
BUN SERPL-MCNC: 18 MG/DL (ref 8–27)
BUN/CREAT SERPL: 20 (ref 10–24)
CALCIUM SERPL-MCNC: 9.5 MG/DL (ref 8.6–10.2)
CHLORIDE SERPL-SCNC: 97 MMOL/L (ref 96–106)
CHOLEST SERPL-MCNC: 219 MG/DL (ref 100–199)
CO2 SERPL-SCNC: 27 MMOL/L (ref 20–29)
CREAT SERPL-MCNC: 0.91 MG/DL (ref 0.76–1.27)
GLOBULIN SER CALC-MCNC: 2.2 G/DL (ref 1.5–4.5)
GLUCOSE SERPL-MCNC: 121 MG/DL (ref 65–99)
HDLC SERPL-MCNC: 62 MG/DL
INTERPRETATION, 910389: NORMAL
LDLC SERPL CALC-MCNC: 93 MG/DL (ref 0–99)
POTASSIUM SERPL-SCNC: 4.2 MMOL/L (ref 3.5–5.2)
PROT SERPL-MCNC: 6.9 G/DL (ref 6–8.5)
SODIUM SERPL-SCNC: 140 MMOL/L (ref 134–144)
TRIGL SERPL-MCNC: 318 MG/DL (ref 0–149)
VLDLC SERPL CALC-MCNC: 64 MG/DL (ref 5–40)

## 2018-12-11 ENCOUNTER — TELEPHONE (OUTPATIENT)
Dept: INTERNAL MEDICINE CLINIC | Age: 68
End: 2018-12-11

## 2018-12-11 DIAGNOSIS — E78.00 PURE HYPERCHOLESTEROLEMIA: Primary | ICD-10-CM

## 2018-12-11 DIAGNOSIS — R73.9 ELEVATED BLOOD SUGAR: ICD-10-CM

## 2018-12-11 NOTE — TELEPHONE ENCOUNTER
Regarding: FW: Test Results Question  Contact: [phone]      ----- Message -----  From: Dexter Miller  Sent: 12/9/2018   6:47 PM  To: Tyler Benavides  Subject: Test Results Question                            ----- Message from [address] [place] [address] [address], [facility] sent at 12/9/2018  6:47 PM EST -----    I did fast prior to the labs on 12/7/18. The labs were done after a fatty lunch. Should I retake them later this month? We are going to [facility] at end of the month and will  not be back until April. Thanks.

## 2018-12-14 ENCOUNTER — HOSPITAL ENCOUNTER (OUTPATIENT)
Dept: LAB | Age: 68
Discharge: HOME | End: 2018-12-14
Payer: MEDICARE

## 2018-12-14 PROCEDURE — 82947 ASSAY GLUCOSE BLOOD QUANT: CPT

## 2018-12-14 PROCEDURE — 83036 HEMOGLOBIN GLYCOSYLATED A1C: CPT

## 2018-12-14 PROCEDURE — 36415 COLL VENOUS BLD VENIPUNCTURE: CPT

## 2018-12-14 PROCEDURE — 80061 LIPID PANEL: CPT

## 2018-12-15 LAB
CHOLEST SERPL-MCNC: 209 MG/DL (ref 100–199)
EST. AVERAGE GLUCOSE BLD GHB EST-MCNC: 117 MG/DL
GLUCOSE SERPL-MCNC: 86 MG/DL (ref 65–99)
HBA1C MFR BLD: 5.7 % (ref 4.8–5.6)
HDLC SERPL-MCNC: 60 MG/DL
INTERPRETATION, 910389: NORMAL
LDLC SERPL CALC-MCNC: 112 MG/DL (ref 0–99)
TRIGL SERPL-MCNC: 186 MG/DL (ref 0–149)
VLDLC SERPL CALC-MCNC: 37 MG/DL (ref 5–40)

## 2019-01-06 ENCOUNTER — TELEPHONE (OUTPATIENT)
Dept: INTERNAL MEDICINE CLINIC | Age: 69
End: 2019-01-06

## 2019-01-06 DIAGNOSIS — G47.00 INSOMNIA, UNSPECIFIED TYPE: ICD-10-CM

## 2019-01-06 RX ORDER — ZOLPIDEM TARTRATE 5 MG/1
5 TABLET ORAL
Qty: 30 TAB | Refills: 5 | Status: SHIPPED | OUTPATIENT
Start: 2019-01-06 | End: 2019-04-12 | Stop reason: SDUPTHER

## 2019-01-06 NOTE — TELEPHONE ENCOUNTER
Regarding: FW: Non-Urgent Medical Question  Contact: [phone]      ----- Message -----  From: Carlos Ragland  Sent: 1/4/2019  12:45 PM  To: [facility]  Subject: Non-Urgent Medical Question                      ----- Message from [address] [place] [address] [address], [facility] sent at 1/4/2019 12:45 PM EST -----    Dr. Venecia Vale:      I am in [place] now. Please call in a refill for my zolpidem prescription at [address] [facility]. I have been here for a week and my coughing is pretty bad. Any suggestions?     Thanks,  Sandor Mo

## 2019-01-21 RX ORDER — SERTRALINE HYDROCHLORIDE 50 MG/1
50 TABLET, FILM COATED ORAL DAILY
Qty: 90 TAB | Refills: 3 | Status: SHIPPED | OUTPATIENT
Start: 2019-01-21 | End: 2019-12-18

## 2019-01-21 RX ORDER — LISINOPRIL 10 MG/1
TABLET ORAL
Qty: 90 TAB | Refills: 3 | Status: SHIPPED | OUTPATIENT
Start: 2019-01-21 | End: 2019-09-04 | Stop reason: SDUPTHER

## 2019-03-13 RX ORDER — DEXLANSOPRAZOLE 60 MG/1
60 CAPSULE, DELAYED RELEASE ORAL
Qty: 15 CAP | Refills: 11 | Status: SHIPPED | OUTPATIENT
Start: 2019-03-13 | End: 2020-04-28

## 2019-04-12 DIAGNOSIS — G47.00 INSOMNIA, UNSPECIFIED TYPE: ICD-10-CM

## 2019-04-12 RX ORDER — ZOLPIDEM TARTRATE 5 MG/1
5 TABLET ORAL
Qty: 30 TAB | Refills: 5 | Status: SHIPPED | OUTPATIENT
Start: 2019-04-12 | End: 2019-07-08 | Stop reason: SDUPTHER

## 2019-05-29 ENCOUNTER — OFFICE VISIT (OUTPATIENT)
Dept: INTERNAL MEDICINE CLINIC | Age: 69
End: 2019-05-29

## 2019-05-29 VITALS
BODY MASS INDEX: 30.38 KG/M2 | HEART RATE: 71 BPM | HEIGHT: 73 IN | SYSTOLIC BLOOD PRESSURE: 108 MMHG | OXYGEN SATURATION: 96 % | WEIGHT: 229.2 LBS | TEMPERATURE: 98.5 F | DIASTOLIC BLOOD PRESSURE: 66 MMHG

## 2019-05-29 DIAGNOSIS — Z71.89 ADVANCE DIRECTIVE DISCUSSED WITH PATIENT: ICD-10-CM

## 2019-05-29 DIAGNOSIS — R73.9 ELEVATED BLOOD SUGAR: ICD-10-CM

## 2019-05-29 DIAGNOSIS — J30.89 NON-SEASONAL ALLERGIC RHINITIS, UNSPECIFIED TRIGGER: ICD-10-CM

## 2019-05-29 DIAGNOSIS — I10 ESSENTIAL HYPERTENSION: ICD-10-CM

## 2019-05-29 DIAGNOSIS — Z00.00 MEDICARE ANNUAL WELLNESS VISIT, SUBSEQUENT: ICD-10-CM

## 2019-05-29 DIAGNOSIS — E78.00 PURE HYPERCHOLESTEROLEMIA: Primary | ICD-10-CM

## 2019-05-29 RX ORDER — AZELASTINE 1 MG/ML
SPRAY, METERED NASAL
Refills: 3 | COMMUNITY
Start: 2019-05-12 | End: 2021-09-01 | Stop reason: SDUPTHER

## 2019-05-29 NOTE — PROGRESS NOTES
Chief Complaint   Patient presents with    Follow-up     hypertension, hyperlipidemia     Visit Vitals  /66   Pulse 71   Temp 98.5 °F (36.9 °C) (Oral)   Ht 6' 1\" (1.854 m)   Wt 229 lb 3.2 oz (104 kg)   SpO2 96%   BMI 30.24 kg/m²       1. Have you been to the ER, urgent care clinic since your last visit? Hospitalized since your last visit? no    2. Have you seen or consulted any other health care providers outside of the [address] [place] [address] since your last visit? Include any pap smears or colon screening.  ENT in [place]

## 2019-05-29 NOTE — PATIENT INSTRUCTIONS
Medicare Wellness Visit, Male The best way to live healthy is to have a lifestyle where you eat a well-balanced diet, exercise regularly, limit alcohol use, and quit all forms of tobacco/nicotine, if applicable. Regular preventive services are another way to keep healthy. Preventive services (vaccines, screening tests, monitoring & exams) can help personalize your care plan, which helps you manage your own care. Screening tests can find health problems at the earliest stages, when they are easiest to treat. [address] [facility] follows the current, evidence-based guidelines published by the [facility] ([facility]) when recommending preventive services for our patients. Because we follow these guidelines, sometimes recommendations change over time as research supports it. (For example, a prostate screening blood test is no longer routinely recommended for men with no symptoms.) Of course, you and your doctor may decide to screen more often for some diseases, based on your risk and co-morbidities (chronic disease you are already diagnosed with). Preventive services for you include: - Medicare offers their members a free annual wellness visit, which is time for you and your primary care provider to discuss and plan for your preventive service needs. Take advantage of this benefit every year! 
-All adults over age 72 should receive the recommended pneumonia vaccines. Current USPSTF guidelines recommend a series of two vaccines for the best pneumonia protection.  
-All adults should have a flu vaccine yearly and an ECG.  All adults age 61 and older should receive a shingles vaccine once in their lifetime.   
-All adults age 38-68 who are overweight should have a diabetes screening test once every three years.  
-Other screening tests & preventive services for persons with diabetes include: an eye exam to screen for diabetic retinopathy, a kidney function test, a foot exam, and stricter control over your cholesterol.  
-Cardiovascular screening for adults with routine risk involves an electrocardiogram (ECG) at intervals determined by the provider.  
-Colorectal cancer screening should be done for adults age 54-65 with no increased risk factors for colorectal cancer. There are a number of acceptable methods of screening for this type of cancer. Each test has its own benefits and drawbacks. Discuss with your provider what is most appropriate for you during your annual wellness visit. The different tests include: colonoscopy (considered the best screening method), a fecal occult blood test, a fecal DNA test, and sigmoidoscopy. 
-All adults born between [facility] should be screened once for Hepatitis C. 
-An Abdominal Aortic Aneurysm (AAA) Screening is recommended for men age 73-68 who has ever smoked in their lifetime. Here is a list of your current Health Maintenance items (your personalized list of preventive services) with a due date: 
 
 
 
Health Maintenance Due Topic Date Due  
 DTaP/Tdap/Td series (1 - Tdap) Prescription given  Shingrix Vaccine Age 50> (1 of 2) Up to date  Pneumococcal 65+ years (1 of 2 - PCV13) Up to date  MEDICARE YEARLY EXAM  05/29/2020

## 2019-05-29 NOTE — PROGRESS NOTES
Subjective:     Elvis West is a 70 y.o. male who presents for follow up of hypertension and hyperlipidemia. Diet and Lifestyle: generally follows a low sodium diet, trying to walk a couple of miles a day and playing golf  Home BP Monitoring: is not measured at home. Well controlled at MD's in [place]. SBP has been < 120. Cardiovascular ROS: taking medications as instructed, no medication side effects noted, no TIA's, no chest pain on exertion, no dyspnea on exertion, no swelling of ankles, no orthostatic dizziness or lightheadedness, no palpitations. New concerns: Had recurrent sinusitis over the winter. Went to ENT in [place] who performed allergy testing. Feels better now. Still with some sinus congestion on right with eye watering and mild cough. Discussed sinus surgery, pt declined at that time. Started on nasal sprays, Astelin and Flonase. Discussed allergy meds but would have to adjust his BP meds. Feels his nasal spray is helping. He has also been recently taking Mucinex to help with the congestion and saline nasal spray. Allergy testing was + for environmental allergies incl most weeds, grasses and trees which here tested. Issues with dry eyes. Dr. Clari Galeano recommended fish oil. Current Outpatient Medications   Medication Sig Dispense Refill    azelastine (ASTELIN) 137 mcg (0.1 %) nasal spray U 2 SPRAYS IEN BID  3    diph,pertuss,acel,,tetanus vac,PF, (ADACEL) 2 Lf-(2.5-5-3-5 mcg)-5Lf/0.5 mL syrg vaccine 0.5 mL by IntraMUSCular route once for 1 dose. 0.5 mL 0    hydroCHLOROthiazide (HYDRODIURIL) 25 mg tablet TAKE 1 TABLET BY MOUTH DAILY 90 Tab 3    zolpidem (AMBIEN) 5 mg tablet Take 1 Tab by mouth nightly as needed for Sleep. Max Daily Amount: 5 mg. 30 Tab 5    diclofenac EC (VOLTAREN) 75 mg EC tablet TAKE 1 TABLET BY MOUTH TWICE DAILY 60 Tab 3    dexlansoprazole (DEXILANT) 60 mg CpDB capsule (delayed release) Take 1 Cap by mouth every fourty-eight (48) hours.  15 Cap 11  lisinopril (PRINIVIL, ZESTRIL) 10 mg tablet TAKE 1 TABLET BY MOUTH DAILY 90 Tab 3    sertraline (ZOLOFT) 50 mg tablet Take 1 Tab by mouth daily. TAKE 1 TABLET BY MOUTH DAILY 90 Tab 3    atorvastatin (LIPITOR) 10 mg tablet TAKE 1 TABLET BY MOUTH DAILY 90 Tab 3    propylene glycol (SYSTANE COMPLETE) 0.6 % drop Apply  to eye.  amLODIPine (NORVASC) 10 mg tablet TAKE 1 TABLET BY MOUTH DAILY 90 Tab 3    metoprolol succinate (TOPROL-XL) 100 mg tablet Take 2 Tabs by mouth daily. 180 Tab 3    GLUCOSAMINE HCL PO Take  by mouth.  fluticasone (FLONASE) 50 mcg/actuation nasal spray 2 Sprays by Both Nostrils route daily.  multivitamin, tx-iron-ca-min (THERA-M W/ IRON) 9 mg iron-400 mcg tab tablet Take 1 Tab by mouth daily.  cyanocobalamin 1,000 mcg tablet Take 1,000 mcg by mouth daily.  loratadine (CLARITIN) 10 mg tablet Take 10 mg by mouth.  carboxymethylcellulose sodium (REFRESH LIQUIGEL) 1 % dlgl Apply  to eye.  metroNIDAZOLE (METROGEL) 1 % topical gel Apply  to affected area daily. Use a thin layer to affected areas after washing      clindamycin phosphate 1 % swab by Apply Externally route.  desoximetasone (TOPICORT) 0.25 % topical cream Apply  to affected area two (2) times a day. Lab Results   Component Value Date/Time    Cholesterol, total 209 (H) 12/14/2018 10:31 AM    HDL Cholesterol 60 12/14/2018 10:31 AM    LDL, calculated 112 (H) 12/14/2018 10:31 AM    Triglyceride 186 (H) 12/14/2018 10:31 AM        Review of Systems, additional:  Pertinent items are noted in HPI. Objective:     Visit Vitals  /66   Pulse 71   Temp 98.5 °F (36.9 °C) (Oral)   Ht 6' 1\" (1.854 m)   Wt 229 lb 3.2 oz (104 kg)   SpO2 96%   BMI 30.24 kg/m²     Appearance: alert, well appearing, and in no distress and oriented to person, place, and time.   General exam:   NARES: clear discharge  SINUSES: nontender  OP - post nasal drainage  NECK: supple, no lad, no bruit, no tm  LUNGS: cta bilat  CV rrr, no m/g/r  ABD: soft, nt, nd, nabs  EXT: no c/c/e. Assessment/Plan:     hypertension well controlled. current treatment plan is effective, no change in therapy. Hyperlipidemia - controlled in past  Repeat labs  Continue same    Allergic rhinitis with chronic sinus congestion - improved with current medical regimen. Discussed dust mite covers to decrease daily allergens. Will f/u with ENT when back in [place] this summer. Elevated BS - repeat A1C. Continue to work on diet and exercise. Orders Placed This Encounter    METABOLIC PANEL, COMPREHENSIVE    LIPID PANEL    HEMOGLOBIN A1C WITH EAG    azelastine (ASTELIN) 137 mcg (0.1 %) nasal spray    diph,pertuss,acel,,tetanus vac,PF, (ADACEL) 2 Lf-(2.5-5-3-5 mcg)-5Lf/0.5 mL syrg vaccine     Follow-up and Dispositions    · Return in about 6 months (around 11/29/2019) for htn, hyperlipidemia. This is the Subsequent Medicare Annual Wellness Exam, performed 12 months or more after the Initial AWV or the last Subsequent AWV    I have reviewed the patient's medical history in detail and updated the computerized patient record. History     Past Medical History:   Diagnosis Date    Allergic rhinitis     Cancer ([facility] [address]     prostate ca - diagnosed age 53    Dry eye     GERD (gastroesophageal reflux disease)     Hypercholesterolemia     Hypertension       Past Surgical History:   Procedure Laterality Date    HX PROSTATECTOMY  2006    HX TONSILLECTOMY       Current Outpatient Medications   Medication Sig Dispense Refill    azelastine (ASTELIN) 137 mcg (0.1 %) nasal spray U 2 SPRAYS IEN BID  3    diph,pertuss,acel,,tetanus vac,PF, (ADACEL) 2 Lf-(2.5-5-3-5 mcg)-5Lf/0.5 mL syrg vaccine 0.5 mL by IntraMUSCular route once for 1 dose. 0.5 mL 0    hydroCHLOROthiazide (HYDRODIURIL) 25 mg tablet TAKE 1 TABLET BY MOUTH DAILY 90 Tab 3    zolpidem (AMBIEN) 5 mg tablet Take 1 Tab by mouth nightly as needed for Sleep. Max Daily Amount: 5 mg. 30 Tab 5    diclofenac EC (VOLTAREN) 75 mg EC tablet TAKE 1 TABLET BY MOUTH TWICE DAILY 60 Tab 3    dexlansoprazole (DEXILANT) 60 mg CpDB capsule (delayed release) Take 1 Cap by mouth every fourty-eight (48) hours. 15 Cap 11    lisinopril (PRINIVIL, ZESTRIL) 10 mg tablet TAKE 1 TABLET BY MOUTH DAILY 90 Tab 3    sertraline (ZOLOFT) 50 mg tablet Take 1 Tab by mouth daily. TAKE 1 TABLET BY MOUTH DAILY 90 Tab 3    atorvastatin (LIPITOR) 10 mg tablet TAKE 1 TABLET BY MOUTH DAILY 90 Tab 3    propylene glycol (SYSTANE COMPLETE) 0.6 % drop Apply  to eye.  amLODIPine (NORVASC) 10 mg tablet TAKE 1 TABLET BY MOUTH DAILY 90 Tab 3    metoprolol succinate (TOPROL-XL) 100 mg tablet Take 2 Tabs by mouth daily. 180 Tab 3    GLUCOSAMINE HCL PO Take  by mouth.  fluticasone (FLONASE) 50 mcg/actuation nasal spray 2 Sprays by Both Nostrils route daily.  multivitamin, tx-iron-ca-min (THERA-M W/ IRON) 9 mg iron-400 mcg tab tablet Take 1 Tab by mouth daily.  cyanocobalamin 1,000 mcg tablet Take 1,000 mcg by mouth daily.  loratadine (CLARITIN) 10 mg tablet Take 10 mg by mouth.  carboxymethylcellulose sodium (REFRESH LIQUIGEL) 1 % dlgl Apply  to eye.  metroNIDAZOLE (METROGEL) 1 % topical gel Apply  to affected area daily. Use a thin layer to affected areas after washing      clindamycin phosphate 1 % swab by Apply Externally route.  desoximetasone (TOPICORT) 0.25 % topical cream Apply  to affected area two (2) times a day.        No Known Allergies  Family History   Problem Relation Age of Onset   [facility] Arthritis-rheumatoid Father     Cancer Father         prostate ca    Diabetes Paternal Grandfather      Social History     Tobacco Use    Smoking status: Never Smoker    Smokeless tobacco: Never Used   Substance Use Topics    Alcohol use: Yes     Patient Active Problem List   Diagnosis Code    Dry eye H04.129    Allergic rhinitis J30.9    Essential hypertension I10    Pure hypercholesterolemia E78.00    Gastroesophageal reflux disease K21.9    Advance directive discussed with patient Z71.89       Depression Risk Factor Screening:     3 most recent PHQ Screens 10/16/2017   Little interest or pleasure in doing things Not at all   Feeling down, depressed, irritable, or hopeless Not at all   Total Score PHQ 2 0     Alcohol Risk Factor Screening: You do not drink alcohol or very rarely. You average more than 14 drinks a week. Functional Ability and Level of Safety:   Hearing Loss  Hearing is good. Activities of Daily Living  The home contains: no safety equipment. Patient does total self care    Fall Risk  Fall Risk Assessment, last 12 mths 5/29/2019   Able to walk? Yes   Fall in past 12 months? No       Abuse Screen  Patient is not abused    Cognitive Screening   Evaluation of Cognitive Function:  Has your family/caregiver stated any concerns about your memory: no  Normal    Patient Care Team   Patient Care Team:  Marifer Cisneros MD as PCP - General (Internal Medicine)    Assessment/Plan   Education and counseling provided:  Are appropriate based on today's review and evaluation   Advanced directive discussed with patient. Diagnoses and all orders for this visit:    1. Pure hypercholesterolemia  -     METABOLIC PANEL, COMPREHENSIVE  -     LIPID PANEL    2. Essential hypertension  -     METABOLIC PANEL, COMPREHENSIVE  -     LIPID PANEL    3. Elevated blood sugar  -     METABOLIC PANEL, COMPREHENSIVE  -     LIPID PANEL  -     HEMOGLOBIN A1C WITH EAG    4. Non-seasonal allergic rhinitis, unspecified trigger    5. Medicare annual wellness visit, subsequent  -     diph,pertuss,acel,,tetanus vac,PF, (ADACEL) 2 Lf-(2.5-5-3-5 mcg)-5Lf/0.5 mL syrg vaccine; 0.5 mL by IntraMUSCular route once for 1 dose.     6. Advance directive discussed with patient        Health Maintenance Due   Topic Date Due    DTaP/Tdap/Td series (1 - Tdap) Prescription given     Shingrix Vaccine Age 50> (1 of 2) Up to date    Pneumococcal 65+ years (1 of 2 - PCV13) Up to date   [id number] [facility]  05/29/2020

## 2019-05-30 ENCOUNTER — HOSPITAL ENCOUNTER (OUTPATIENT)
Dept: LAB | Age: 69
Discharge: HOME | End: 2019-05-30
Payer: MEDICARE

## 2019-05-30 PROCEDURE — 80053 COMPREHEN METABOLIC PANEL: CPT

## 2019-05-30 PROCEDURE — 83036 HEMOGLOBIN GLYCOSYLATED A1C: CPT

## 2019-05-30 PROCEDURE — 80061 LIPID PANEL: CPT

## 2019-05-30 PROCEDURE — 36415 COLL VENOUS BLD VENIPUNCTURE: CPT

## 2019-05-31 LAB
ALBUMIN SERPL-MCNC: 4.5 G/DL (ref 3.6–4.8)
ALBUMIN/GLOB SERPL: 2 {RATIO} (ref 1.2–2.2)
ALP SERPL-CCNC: 65 IU/L (ref 39–117)
ALT SERPL-CCNC: 17 IU/L (ref 0–44)
AST SERPL-CCNC: 16 IU/L (ref 0–40)
BILIRUB SERPL-MCNC: 0.4 MG/DL (ref 0–1.2)
BUN SERPL-MCNC: 25 MG/DL (ref 8–27)
BUN/CREAT SERPL: 18 (ref 10–24)
CALCIUM SERPL-MCNC: 9.4 MG/DL (ref 8.6–10.2)
CHLORIDE SERPL-SCNC: 102 MMOL/L (ref 96–106)
CHOLEST SERPL-MCNC: 159 MG/DL (ref 100–199)
CO2 SERPL-SCNC: 22 MMOL/L (ref 20–29)
CREAT SERPL-MCNC: 1.38 MG/DL (ref 0.76–1.27)
EST. AVERAGE GLUCOSE BLD GHB EST-MCNC: 114 MG/DL
GLOBULIN SER CALC-MCNC: 2.3 G/DL (ref 1.5–4.5)
GLUCOSE SERPL-MCNC: 92 MG/DL (ref 65–99)
HBA1C MFR BLD: 5.6 % (ref 4.8–5.6)
HDLC SERPL-MCNC: 63 MG/DL
INTERPRETATION, 910389: NORMAL
INTERPRETATION: NORMAL
LDLC SERPL CALC-MCNC: 68 MG/DL (ref 0–99)
PDF IMAGE, 910387: NORMAL
POTASSIUM SERPL-SCNC: 4.4 MMOL/L (ref 3.5–5.2)
PROT SERPL-MCNC: 6.8 G/DL (ref 6–8.5)
SODIUM SERPL-SCNC: 140 MMOL/L (ref 134–144)
TRIGL SERPL-MCNC: 140 MG/DL (ref 0–149)
VLDLC SERPL CALC-MCNC: 28 MG/DL (ref 5–40)

## 2019-07-11 ENCOUNTER — OFFICE VISIT (OUTPATIENT)
Dept: INTERNAL MEDICINE CLINIC | Age: 69
End: 2019-07-11

## 2019-07-11 VITALS
SYSTOLIC BLOOD PRESSURE: 112 MMHG | OXYGEN SATURATION: 97 % | DIASTOLIC BLOOD PRESSURE: 55 MMHG | HEIGHT: 73 IN | HEART RATE: 59 BPM | TEMPERATURE: 97.9 F | WEIGHT: 228 LBS | BODY MASS INDEX: 30.22 KG/M2

## 2019-07-11 DIAGNOSIS — M70.21 OLECRANON BURSITIS OF RIGHT ELBOW: Primary | ICD-10-CM

## 2019-07-11 NOTE — PROGRESS NOTES
HISTORY OF PRESENT ILLNESS  Lynn Rueda is a 70 y.o. male. 2-3 weeks ago developed swelling right elbow. Denies trauma to area. Enlarged approx 1 week ago but has not decreased. Tried ACE bandage without relief. No pain, redness or warmth in the area. No systemic fevers or chills. ROS  See above  Physical Exam   Constitutional: He appears well-developed and well-nourished. HENT:   Head: Normocephalic and atraumatic. Musculoskeletal:   Right elbow with fairly large olecranon bursa. Area is not warm or red. It is nontender. Neurological: Abnormal muscle tone: 6. Vitals reviewed. ASSESSMENT and PLAN  Right olecranon bursa - drained in office today. Change bandaid daily. Ace bandage for ocmpression x 1 week. Orders Placed This Encounter    triamcinolone acetonide (KENALOG) 10 mg/mL injection     Follow-up and Dispositions    · Return if symptoms worsen or fail to improve. Indications:   Symptomatic relief of large effusion    Procedure:  After consent was obtained, using sterile technique the right olecranon bursa was prepped using alcohol swabs. Local anesthetic used: spray lidocaine. . The joint was entered and 6 ml's of straw colored/clear fluid was withdrawn and discarded. Kenalog 20mg was injected into the joint and the needle withdrawn. The procedure was well tolerated. The patient is asked to continue to rest the joint for a few more days before resuming regular activities. It may be more painful for the first 1-2 days. Watch for fever, or increased swelling or persistent pain in the joint. Call or return to clinic prn if such symptoms occur or there is failure to improve as anticipated.     [facility]  OFFICE PROCEDURE PROGRESS NOTE        Chart reviewed for the following:   Andrea Keating MD, have reviewed the History, Physical and updated the Allergic reactions for [address] performed immediately prior to start of procedure:   Dereck Gil MD, have performed the following reviews on Sophia Booth prior to the start of the procedure on (date) 7/11/2019 at (time) 12:30pm            * Patient Identity Verified Using Two Approved Identifiers  * Patient Allergies Verified  * Pain Assessment Pre-Procedure: 0  * Informed Consent Obtained   * Procedure to be Performed Verified: right olecranon bursa drainage and injection with steroids  * Procedure site verified and marked as necessary  * Risks and Benefits explained to the patient  * Patient was positioned for comfort  * Consent was signed and verified     Date/Time of Procedure: 7/11/2019               12:30PM    Procedure performed by: Thelma Chamberlain MD  Assisted by (if applicable): Emelyn Downs LPN  How tolerated by patient: well  Comments: none    *Pain Assessment Post-Procedure: 0   *Patient Education Documented  *Post-Procedure Instructions Documented

## 2019-07-11 NOTE — PROGRESS NOTES
Chief Complaint   Patient presents with    Elbow Swelling     right     Visit Vitals  /55   Pulse (!) 59   Temp 97.9 °F (36.6 °C) (Oral)   Ht 6' 1\" (1.854 m)   Wt 228 lb (103.4 kg)   SpO2 97%   BMI 30.08 kg/m²     1. Have you been to the ER, urgent care clinic since your last visit? Hospitalized since your last visit? no    2. Have you seen or consulted any other health care providers outside of the [address] [place] [address] since your last visit? Include any pap smears or colon screening.  no

## 2019-09-05 ENCOUNTER — OFFICE VISIT (OUTPATIENT)
Dept: INTERNAL MEDICINE CLINIC | Age: 69
End: 2019-09-05

## 2019-09-05 VITALS
HEART RATE: 63 BPM | SYSTOLIC BLOOD PRESSURE: 111 MMHG | DIASTOLIC BLOOD PRESSURE: 64 MMHG | OXYGEN SATURATION: 97 % | WEIGHT: 229 LBS | TEMPERATURE: 98.2 F | BODY MASS INDEX: 30.35 KG/M2 | HEIGHT: 73 IN

## 2019-09-05 DIAGNOSIS — J30.89 NON-SEASONAL ALLERGIC RHINITIS, UNSPECIFIED TRIGGER: Primary | ICD-10-CM

## 2019-09-05 RX ORDER — MONTELUKAST SODIUM 10 MG/1
10 TABLET ORAL DAILY
Qty: 30 TAB | Refills: 6 | Status: SHIPPED | OUTPATIENT
Start: 2019-09-05 | End: 2019-09-05 | Stop reason: SDUPTHER

## 2019-09-05 NOTE — PROGRESS NOTES
HISTORY OF PRESENT ILLNESS  Zita Camacho is a 70 y.o. male. Saw MD if [place] for his sinuses and placed on 2 nasal sprays (Astelin and Flonase) with improved sinus congestion. He continues on these nasal sprays. Now having a lot of post nasal drainage causing him to cough. Taking Mucinex. Feels pulled a muscle in left upper chest coughing. Talked with MD in [place] who placed him on Xyzal but this was no more effective from his zyrte. Had allergy testing completed in [place]. Stated if no better he may need immunotx. Current Outpatient Medications:     lisinopril (PRINIVIL, ZESTRIL) 10 mg tablet, TAKE 1 TABLET BY MOUTH DAILY, Disp: 90 Tab, Rfl: 3    amLODIPine (NORVASC) 10 mg tablet, TAKE 1 TABLET BY MOUTH DAILY, Disp: 90 Tab, Rfl: 3    zolpidem (AMBIEN) 5 mg tablet, TAKE 1 TABLET BY MOUTH EVERY NIGHT AT BEDTIME AS NEEDED FOR SLEEP, Disp: 30 Tab, Rfl: 2    diclofenac EC (VOLTAREN) 75 mg EC tablet, TAKE 1 TABLET BY MOUTH TWICE DAILY, Disp: 60 Tab, Rfl: 3    azelastine (ASTELIN) 137 mcg (0.1 %) nasal spray, U 2 SPRAYS IEN BID, Disp: , Rfl: 3    hydroCHLOROthiazide (HYDRODIURIL) 25 mg tablet, TAKE 1 TABLET BY MOUTH DAILY, Disp: 90 Tab, Rfl: 3    dexlansoprazole (DEXILANT) 60 mg CpDB capsule (delayed release), Take 1 Cap by mouth every fourty-eight (48) hours. , Disp: 15 Cap, Rfl: 11    sertraline (ZOLOFT) 50 mg tablet, Take 1 Tab by mouth daily. TAKE 1 TABLET BY MOUTH DAILY, Disp: 90 Tab, Rfl: 3    atorvastatin (LIPITOR) 10 mg tablet, TAKE 1 TABLET BY MOUTH DAILY, Disp: 90 Tab, Rfl: 3    propylene glycol (SYSTANE COMPLETE) 0.6 % drop, Apply  to eye., Disp: , Rfl:     metoprolol succinate (TOPROL-XL) 100 mg tablet, Take 2 Tabs by mouth daily. , Disp: 180 Tab, Rfl: 3    GLUCOSAMINE HCL PO, Take  by mouth., Disp: , Rfl:     fluticasone (FLONASE) 50 mcg/actuation nasal spray, 2 Sprays by Both Nostrils route daily. , Disp: , Rfl:     multivitamin, tx-iron-ca-min (THERA-M W/ IRON) 9 mg iron-400 mcg tab tablet, Take 1 Tab by mouth daily. , Disp: , Rfl:     cyanocobalamin 1,000 mcg tablet, Take 1,000 mcg by mouth daily. , Disp: , Rfl:     loratadine (CLARITIN) 10 mg tablet, Take 10 mg by mouth., Disp: , Rfl:     carboxymethylcellulose sodium (REFRESH LIQUIGEL) 1 % dlgl, Apply  to eye., Disp: , Rfl:     metroNIDAZOLE (METROGEL) 1 % topical gel, Apply  to affected area daily. Use a thin layer to affected areas after washing, Disp: , Rfl:     clindamycin phosphate 1 % swab, by Apply Externally route., Disp: , Rfl:     desoximetasone (TOPICORT) 0.25 % topical cream, Apply  to affected area two (2) times a day., Disp: , Rfl:     /64   Pulse 63   Temp 98.2 °F (36.8 °C) (Oral)   Ht 6' 1\" (1.854 m)   Wt 229 lb (103.9 kg)   SpO2 97%   BMI 30.21 kg/m²         ROS  See above  Physical Exam   Constitutional: He appears well-developed and well-nourished. HENT:   Head: Normocephalic and atraumatic. Right Ear: External ear normal.   Left Ear: External ear normal.   Nares - edematous with clear discharge  Sinuses - nontender  OP - post nasal drainage. Neck: Neck supple. No thyromegaly present. Cardiovascular: Normal rate, regular rhythm and normal heart sounds. Exam reveals no gallop and no friction rub. No murmur heard. Pulmonary/Chest: Effort normal and breath sounds normal.   Lymphadenopathy:     He has no cervical adenopathy. Vitals reviewed. ASSESSMENT and PLAN  Allergic rhinitis - add singulair. If no improvement, discussed immunotx.     Orders Placed This Encounter    montelukast (SINGULAIR) 10 mg tablet

## 2019-12-11 ENCOUNTER — OFFICE VISIT (OUTPATIENT)
Dept: INTERNAL MEDICINE CLINIC | Age: 69
End: 2019-12-11

## 2019-12-11 VITALS
HEART RATE: 68 BPM | SYSTOLIC BLOOD PRESSURE: 111 MMHG | DIASTOLIC BLOOD PRESSURE: 65 MMHG | TEMPERATURE: 98.5 F | HEIGHT: 73 IN | OXYGEN SATURATION: 96 % | WEIGHT: 229 LBS | BODY MASS INDEX: 30.35 KG/M2

## 2019-12-11 DIAGNOSIS — K21.9 GASTROESOPHAGEAL REFLUX DISEASE, ESOPHAGITIS PRESENCE NOT SPECIFIED: ICD-10-CM

## 2019-12-11 DIAGNOSIS — I10 ESSENTIAL HYPERTENSION: Primary | ICD-10-CM

## 2019-12-11 DIAGNOSIS — E66.9 OBESITY (BMI 30.0-34.9): ICD-10-CM

## 2019-12-11 DIAGNOSIS — E78.00 PURE HYPERCHOLESTEROLEMIA: ICD-10-CM

## 2019-12-11 DIAGNOSIS — R73.9 ELEVATED BLOOD SUGAR: ICD-10-CM

## 2019-12-11 PROBLEM — H43.391 VITREOUS FLOATERS OF RIGHT EYE: Status: ACTIVE | Noted: 2017-12-18

## 2019-12-11 PROBLEM — H02.9 LESION OF UPPER EYELID: Status: ACTIVE | Noted: 2017-12-18

## 2019-12-11 PROBLEM — H04.123 DRY EYE SYNDROME OF BOTH LACRIMAL GLANDS: Status: ACTIVE | Noted: 2017-12-18

## 2019-12-11 PROBLEM — H25.13 AGE-RELATED NUCLEAR CATARACT OF BOTH EYES: Status: ACTIVE | Noted: 2017-12-18

## 2019-12-11 PROBLEM — H52.4 BILATERAL PRESBYOPIA: Status: ACTIVE | Noted: 2017-12-18

## 2019-12-11 RX ORDER — OLOPATADINE HYDROCHLORIDE 1 MG/ML
SOLUTION/ DROPS OPHTHALMIC
COMMUNITY

## 2019-12-11 RX ORDER — LEVOCETIRIZINE DIHYDROCHLORIDE 5 MG/1
TABLET, FILM COATED ORAL
Refills: 1 | COMMUNITY
Start: 2019-11-01

## 2019-12-11 NOTE — PROGRESS NOTES
Subjective:     Cristian Velazquez is a 78 y.o. male who presents for follow up of hypertension and hyperlipidemia. Diet and Lifestyle: generally follows a low sodium diet, exercises regularly - walking and will play more golf in [address] BP Monitoring: is not measured at home    Cardiovascular ROS: taking medications as instructed, no medication side effects noted, no TIA's, no chest pain on exertion, no dyspnea on exertion, no swelling of ankles, no orthostatic dizziness or lightheadedness, no palpitations. New concerns:   Continues with post nasal drainage  Taking Dexilant as needed approx 4 times a month  Finds MVI is hard to swallow. OK with the Mucinex and no difficulties with other meds or foods. Wonders about stopping. Seeing Dr. Kristi Farley on Friday for yearly eye exam.  Has colonoscopy with Dr. Babita Mitchell in 1 week (q5y secondary to polyps). Going to [place] on 12/30    Current Outpatient Medications   Medication Sig Dispense Refill    sodium chloride (OCEAN NASAL) 0.65 % nasal squeeze bottle Ocean Nasal 0.65 % spray aerosol   Take 1 spray 3 times a day by nasal route.       olopatadine (PATANOL) 0.1 % ophthalmic solution olopatadine 0.1 % eye drops   INSTILL 1 DROP IN AFFECTED EYE(S) TWICE DAILY AS DIRECTED AT AN INTERVAL OF 6 TO 8 HOURS      levocetirizine (XYZAL) 5 mg tablet TK 1 T PO QD  1    zolpidem (AMBIEN) 5 mg tablet TAKE 1 TABLET BY MOUTH EVERY NIGHT AS NEEDED FOR SLEEP 30 Tab 2    diclofenac EC (VOLTAREN) 75 mg EC tablet TAKE 1 TABLET BY MOUTH TWICE DAILY 60 Tab 5    metoprolol succinate (TOPROL-XL) 100 mg tablet TAKE 2 TABLETS BY MOUTH DAILY 180 Tab 3    montelukast (SINGULAIR) 10 mg tablet TAKE 1 TABLET BY MOUTH DAILY 90 Tab 3    lisinopril (PRINIVIL, ZESTRIL) 10 mg tablet TAKE 1 TABLET BY MOUTH DAILY 90 Tab 3    amLODIPine (NORVASC) 10 mg tablet TAKE 1 TABLET BY MOUTH DAILY 90 Tab 3    azelastine (ASTELIN) 137 mcg (0.1 %) nasal spray U 2 SPRAYS IEN BID  3    hydroCHLOROthiazide (HYDRODIURIL) 25 mg tablet TAKE 1 TABLET BY MOUTH DAILY 90 Tab 3    dexlansoprazole (DEXILANT) 60 mg CpDB capsule (delayed release) Take 1 Cap by mouth every fourty-eight (48) hours. 15 Cap 11    sertraline (ZOLOFT) 50 mg tablet Take 1 Tab by mouth daily. TAKE 1 TABLET BY MOUTH DAILY 90 Tab 3    atorvastatin (LIPITOR) 10 mg tablet TAKE 1 TABLET BY MOUTH DAILY 90 Tab 3    propylene glycol (SYSTANE COMPLETE) 0.6 % drop Apply  to eye.  GLUCOSAMINE HCL PO Take  by mouth.  fluticasone (FLONASE) 50 mcg/actuation nasal spray 2 Sprays by Both Nostrils route daily.  multivitamin, tx-iron-ca-min (THERA-M W/ IRON) 9 mg iron-400 mcg tab tablet Take 1 Tab by mouth daily.  cyanocobalamin 1,000 mcg tablet Take 1,000 mcg by mouth daily.  carboxymethylcellulose sodium (REFRESH LIQUIGEL) 1 % dlgl Apply  to eye.  metroNIDAZOLE (METROGEL) 1 % topical gel Apply  to affected area daily. Use a thin layer to affected areas after washing      clindamycin phosphate 1 % swab by Apply Externally route.  desoximetasone (TOPICORT) 0.25 % topical cream Apply  to affected area two (2) times a day. Lab Results   Component Value Date/Time    Cholesterol, total 159 05/30/2019 11:08 AM    HDL Cholesterol 63 05/30/2019 11:08 AM    LDL, calculated 68 05/30/2019 11:08 AM    Triglyceride 140 05/30/2019 11:08 AM     Lab Results   Component Value Date/Time    ALT (SGPT) 17 05/30/2019 11:08 AM    AST (SGOT) 16 05/30/2019 11:08 AM    Alk.  phosphatase 65 05/30/2019 11:08 AM    Bilirubin, direct 0.17 05/15/2018 09:02 AM    Bilirubin, total 0.4 05/30/2019 11:08 AM    Albumin 4.5 05/30/2019 11:08 AM    Protein, total 6.8 05/30/2019 11:08 AM     Lab Results   Component Value Date/Time    GFR est non-AA 52 (L) 05/30/2019 11:08 AM    GFR est AA 60 05/30/2019 11:08 AM    Creatinine 1.38 (H) 05/30/2019 11:08 AM    BUN 25 05/30/2019 11:08 AM    Sodium 140 05/30/2019 11:08 AM    Potassium 4.4 05/30/2019 11:08 AM    Chloride 102 05/30/2019 11:08 AM    CO2 22 05/30/2019 11:08 AM        Review of Systems, additional:  Pertinent items are noted in HPI. Objective:     Visit Vitals  /65   Pulse 68   Temp 98.5 °F (36.9 °C) (Oral)   Ht 6' 1\" (1.854 m)   Wt 229 lb (103.9 kg)   SpO2 96%   BMI 30.21 kg/m²     Appearance: alert, well appearing, and in no distress and oriented to person, place, and time. General exam:    NECK: supple, no lad, no bruit, no tm  LUNGS: cta bilat  CV rrr, no m/g/r  ABD: soft, nt, nd, nabs  EXT: no c/c/e    Assessment/Plan:     hypertension well controlled. current treatment plan is effective, no change in therapy. Hyperlipidemia- controlled, cont same  Repeat labs    Elevated BS - repeat A1C    Obesity - continue to work on diet and exercise. GERD - controlled,cont same      Orders Placed This Encounter    METABOLIC PANEL, COMPREHENSIVE    LIPID PANEL    HEMOGLOBIN A1C WITH EAG    sodium chloride (OCEAN NASAL) 0.65 % nasal squeeze bottle    olopatadine (PATANOL) 0.1 % ophthalmic solution    levocetirizine (XYZAL) 5 mg tablet     Follow-up and Dispositions    · Return in about 6 months (around 6/11/2020) for hyperlipidemia, htn.

## 2019-12-11 NOTE — PROGRESS NOTES
Patient received flu vaccine. Chief Complaint   Patient presents with    Follow-up     Visit Vitals  /65   Pulse 68   Temp 98.5 °F (36.9 °C) (Oral)   Ht 6' 1\" (1.854 m)   Wt 229 lb (103.9 kg)   SpO2 96%   BMI 30.21 kg/m²     1. Have you been to the ER, urgent care clinic since your last visit? Hospitalized since your last visit?no    2. Have you seen or consulted any other health care providers outside of the [address] [place] [address] since your last visit? Include any pap smears or colon screening.   no

## 2019-12-17 ENCOUNTER — HOSPITAL ENCOUNTER (OUTPATIENT)
Dept: LAB | Age: 69
Discharge: HOME | End: 2019-12-17
Payer: MEDICARE

## 2019-12-17 PROCEDURE — 85018 HEMOGLOBIN: CPT

## 2019-12-17 PROCEDURE — 80053 COMPREHEN METABOLIC PANEL: CPT

## 2019-12-17 PROCEDURE — 36415 COLL VENOUS BLD VENIPUNCTURE: CPT

## 2019-12-17 PROCEDURE — 80061 LIPID PANEL: CPT

## 2019-12-18 RX ORDER — SERTRALINE HYDROCHLORIDE 50 MG/1
TABLET, FILM COATED ORAL
Qty: 90 TAB | Refills: 3 | Status: SHIPPED | OUTPATIENT
Start: 2019-12-18 | End: 2020-03-16 | Stop reason: SDUPTHER

## 2019-12-31 DIAGNOSIS — G47.00 INSOMNIA, UNSPECIFIED TYPE: ICD-10-CM

## 2020-01-01 DIAGNOSIS — G47.00 INSOMNIA, UNSPECIFIED TYPE: ICD-10-CM

## 2020-01-01 RX ORDER — ZOLPIDEM TARTRATE 5 MG/1
5 TABLET ORAL
Qty: 30 TAB | Refills: 2 | Status: SHIPPED | OUTPATIENT
Start: 2020-01-01 | End: 2020-06-04 | Stop reason: SDUPTHER

## 2020-01-02 ENCOUNTER — TELEPHONE (OUTPATIENT)
Dept: INTERNAL MEDICINE CLINIC | Age: 70
End: 2020-01-02

## 2020-01-02 DIAGNOSIS — Z12.11 SCREEN FOR COLON CANCER: Primary | ICD-10-CM

## 2020-01-02 NOTE — TELEPHONE ENCOUNTER
----- Message from Vinh Garcia LPN sent at 67/46/1565  4:07 PM EST -----  Regarding: FW: Non-Urgent Medical Question  Contact: [phone]    ----- Message -----  From: Anurag Domínguez  Sent: 12/23/2019   4:00 PM EST  To: Renzo Benavides  Subject: RE: Non-Urgent Medical Question                  Mail to [address] [place] [address] would be best.  ----- Message -----  From: Roger Jason MD  Sent: 12/23/2019 11:32 AM EST  To: Anurag Domínguez  Subject: RE: Non-Urgent Medical Question  I am tracking down your lab work. It did not make it to our system. I can order the Cologuard but I do not know if it will arrive by the time you have left for [place]. I can see if they can mail it to your [place] address or do you have your mail forwarded?      ----- Message -----     From: Anurag Domínguez     Sent: 12/21/2019 11:53 AM EST       To: Roger Jason MD  Subject: RE: Non-Urgent Medical Question    Dr Raymond Failing:  I have 2 questions. (1)  I had blood work done on 12/17, but don't see it on my chart yet. Do you have the results?       (2)  Did colonoscopy on 12/18 and Dr Elder Arnett was unable to complete the exam due to inadequate prep (on my part). He said he was going to ask you to order a cologuard test.  I will be in [facility] after 12/30 so I just want to check in on this.     Many thanks and Xochitl Jiménez,  [address]

## 2020-01-26 RX ORDER — ATORVASTATIN CALCIUM 10 MG/1
TABLET, FILM COATED ORAL
Qty: 90 TAB | Refills: 3 | Status: SHIPPED | OUTPATIENT
Start: 2020-01-26 | End: 2020-11-30

## 2020-02-03 ENCOUNTER — TELEPHONE (OUTPATIENT)
Dept: INTERNAL MEDICINE CLINIC | Age: 70
End: 2020-02-03

## 2020-02-03 ENCOUNTER — DOCUMENTATION ONLY (OUTPATIENT)
Dept: INTERNAL MEDICINE CLINIC | Age: 70
End: 2020-02-03

## 2020-03-06 ENCOUNTER — OFFICE VISIT (OUTPATIENT)
Dept: INTERNAL MEDICINE CLINIC | Age: 70
End: 2020-03-06

## 2020-03-06 VITALS
HEIGHT: 73 IN | DIASTOLIC BLOOD PRESSURE: 62 MMHG | BODY MASS INDEX: 30.35 KG/M2 | HEART RATE: 74 BPM | WEIGHT: 229 LBS | RESPIRATION RATE: 16 BRPM | SYSTOLIC BLOOD PRESSURE: 116 MMHG | OXYGEN SATURATION: 94 % | TEMPERATURE: 98.2 F

## 2020-03-06 DIAGNOSIS — J40 BRONCHITIS: Primary | ICD-10-CM

## 2020-03-06 RX ORDER — CODEINE PHOSPHATE AND GUAIFENESIN 10; 100 MG/5ML; MG/5ML
5 SOLUTION ORAL
Qty: 45 ML | Refills: 0 | Status: SHIPPED | OUTPATIENT
Start: 2020-03-06 | End: 2020-03-09

## 2020-03-06 RX ORDER — ALBUTEROL SULFATE 90 UG/1
1 AEROSOL, METERED RESPIRATORY (INHALATION)
Qty: 1 INHALER | Refills: 0 | Status: SHIPPED | OUTPATIENT
Start: 2020-03-06 | End: 2020-04-28

## 2020-03-06 RX ORDER — LEVOFLOXACIN 500 MG/1
500 TABLET, FILM COATED ORAL DAILY
Qty: 10 TAB | Refills: 0 | Status: SHIPPED | OUTPATIENT
Start: 2020-03-06 | End: 2020-04-30 | Stop reason: ALTCHOICE

## 2020-03-06 NOTE — PROGRESS NOTES
Chief Complaint   Patient presents with    Cold Symptoms     and wheezing 3/2/2020     he is a 78y.o. year old male who presents for evaluation. Patient presents the office today for cold symptoms. He reports that he and his wife state in [place] during the winter months but had to fly to [place] recently to take care of some business. Since then the patient reports that he has been having a bit of a sore throat with some postnasal drip. He also has a lot of chest congestion. Patient reports that he has not been able to bring the mucus up. He states that he started with some wheeze on March 2 or so. Patient reports he has not been running a fever and he is not experience chills or muscle aches. Reviewed PmHx, RxHx, FmHx, SocHx, AllgHx and updated and dated in the chart. Review of Systems - negative except as listed above    Objective:     Vitals:    03/06/20 1015   BP: 116/62   Pulse: 74   Resp: 16   Temp: 98.2 °F (36.8 °C)   TempSrc: Oral   SpO2: 94%   Weight: 229 lb (103.9 kg)   Height: 6' 1\" (1.854 m)     Physical Examination: General appearance - alert, well appearing, and in no distress  Ears - bilateral TM's and external ear canals normal  Nose - mucosal congestion and mucosal erythema  Mouth - erythematous  Neck - supple, no significant adenopathy  Chest - rhonchi heard  Heart - normal rate and regular rhythm    Assessment/ Plan:   Diagnoses and all orders for this visit:    1. Bronchitis  -     levoFLOXacin (LEVAQUIN) 500 mg tablet; Take 1 Tab by mouth daily. -     albuterol (PROVENTIL HFA, VENTOLIN HFA, PROAIR HFA) 90 mcg/actuation inhaler; Take 1 Puff by inhalation every six (6) hours as needed for Wheezing.  -     guaiFENesin-codeine (CHERATUSSIN AC) 100-10 mg/5 mL solution; Take 5 mL by mouth three (3) times daily as needed for Cough for up to 3 days. Max Daily Amount: 15 mL.        I explained to patient because he will be traveling soon I would like for him to start an antibiotic that has a broad-spectrum. Patient was also given an inhaler that he may use when he feels like he is wheezing. Lastly patient was given guaifenesin codeine. Advised him not to take this medication if he needs to drive will be about. Instead during the day he may take plain Mucinex. Patient should follow-up and get reevaluated if symptoms worsen or does not improve. I have discussed the diagnosis with the patient and the intended plan as seen in the above orders. The patient has received an after-visit summary and questions were answered concerning future plans.      Medication Side Effects and Warnings were discussed with patient: yes    Patient Labs were reviewed and or requested: n/a  Patient Past Records were reviewed and or requested  yes    Sherron Luque PA-C

## 2020-03-17 RX ORDER — SERTRALINE HYDROCHLORIDE 50 MG/1
50 TABLET, FILM COATED ORAL DAILY
Qty: 90 TAB | Refills: 3 | Status: SHIPPED | OUTPATIENT
Start: 2020-03-17 | End: 2021-02-25

## 2020-03-18 RX ORDER — METOPROLOL SUCCINATE 100 MG/1
200 TABLET, EXTENDED RELEASE ORAL DAILY
Qty: 180 TAB | Refills: 3 | Status: SHIPPED | OUTPATIENT
Start: 2020-03-18 | End: 2020-10-14

## 2020-04-06 RX ORDER — DICLOFENAC SODIUM 75 MG/1
TABLET, DELAYED RELEASE ORAL
Qty: 60 TAB | Refills: 5 | Status: SHIPPED | OUTPATIENT
Start: 2020-04-06 | End: 2020-09-30

## 2020-04-24 RX ORDER — HYDROCHLOROTHIAZIDE 25 MG/1
TABLET ORAL
Qty: 90 TAB | Refills: 3 | Status: SHIPPED | OUTPATIENT
Start: 2020-04-24 | End: 2021-05-05

## 2020-04-28 RX ORDER — DEXLANSOPRAZOLE 60 MG/1
CAPSULE, DELAYED RELEASE ORAL
Qty: 15 CAP | Refills: 11 | Status: SHIPPED | OUTPATIENT
Start: 2020-04-28 | End: 2021-06-27

## 2020-04-30 ENCOUNTER — VIRTUAL VISIT (OUTPATIENT)
Dept: INTERNAL MEDICINE CLINIC | Age: 70
End: 2020-04-30

## 2020-04-30 DIAGNOSIS — T46.4X5A COUGH DUE TO ACE INHIBITOR: Primary | ICD-10-CM

## 2020-04-30 DIAGNOSIS — R05.8 COUGH DUE TO ACE INHIBITOR: Primary | ICD-10-CM

## 2020-04-30 RX ORDER — CETIRIZINE HYDROCHLORIDE 10 MG/1
CAPSULE, LIQUID FILLED ORAL
COMMUNITY
End: 2020-04-30 | Stop reason: ALTCHOICE

## 2020-04-30 RX ORDER — LOSARTAN POTASSIUM 50 MG/1
50 TABLET ORAL DAILY
Qty: 30 TAB | Refills: 5 | Status: SHIPPED | OUTPATIENT
Start: 2020-04-30 | End: 2020-06-22 | Stop reason: DRUGHIGH

## 2020-04-30 RX ORDER — IPRATROPIUM BROMIDE 42 UG/1
SPRAY, METERED NASAL
COMMUNITY
Start: 2020-04-27 | End: 2021-09-01 | Stop reason: SDUPTHER

## 2020-04-30 NOTE — PROGRESS NOTES
Sandra Soliz is a 78 y.o. male evaluated via telephone on 4/30/2020. Consent:  He and/or health care decision maker is aware that he may receive a bill for this telephone service, depending on his insurance coverage, and has provided verbal consent to proceed: Yes      Documentation:  I communicated with the patient and/or health care decision maker about cough. Details of this discussion including any medical advice provided: see below      I affirm this is a Patient Initiated Episode with an Established Patient who has not had a related appointment within my department in the past 7 days or scheduled within the next 24 hours. Total Time: minutes: 11-20 minutes    Note: not billable if this call serves to triage the patient into an appointment for the relevant concern      Emmy Kapoor MD   HISTORY OF PRESENT ILLNESS  Sandra Soliz is a 78 y.o. male. HPI  Cough with post nasal drainage. He had been seen by [facility] for a cough, congestion and flying in from [place]. Placed on Levaquin, Chertussin and an albuterol HFA. Had a lot of muscle pain/tendonitis with the Levaquin but recovered. Feb 21 had colonoscopy in [place] after + cologuard testing. Had 1 polyp and 1 ulcer. Polyp was benign. Recommended repeat in 5 years. \  Over 1 year he has had a persistent cough. Feels caused by post nasal drainage. Saw Allergist in [place] in March. Agreed that this was secondary to  Allergies. Best solution would be immunotx but a lot of travel and would not be realistic. Changed to Ipratropium and Astelin ns and stopped Flonase. Changed him to Xyzal without improvement in cough. Wonders if meds are contributing.   .      Current Outpatient Medications:     ipratropium (ATROVENT) 42 mcg (0.06 %) nasal spray, U 2 SPRAYS IEN TID, Disp: , Rfl:     Cetirizine (ZyrTEC) 10 mg cap, Take  by mouth., Disp: , Rfl:     Dexilant 60 mg CpDB capsule (delayed release), TAKE 1 CAPSULE BY MOUTH EVERY 48 HOURS, Disp: 15 Cap, Rfl: 11    ProAir HFA 90 mcg/actuation inhaler, INHALE 1 PUFF BY MOUTH EVERY 6 HOURS AS NEEDED FOR WHEEZING, Disp: 8.5 g, Rfl: 3    hydroCHLOROthiazide (HYDRODIURIL) 25 mg tablet, TAKE 1 TABLET BY MOUTH DAILY, Disp: 90 Tab, Rfl: 3    diclofenac EC (VOLTAREN) 75 mg EC tablet, TAKE 1 TABLET BY MOUTH TWICE DAILY, Disp: 60 Tab, Rfl: 5    metoprolol succinate (TOPROL-XL) 100 mg tablet, Take 2 Tabs by mouth daily. , Disp: 180 Tab, Rfl: 3    sertraline (ZOLOFT) 50 mg tablet, Take 1 Tab by mouth daily. TAKE 1 TABLET BY MOUTH EVERY DAY, Disp: 90 Tab, Rfl: 3    atorvastatin (LIPITOR) 10 mg tablet, TAKE 1 TABLET BY MOUTH DAILY, Disp: 90 Tab, Rfl: 3    zolpidem (AMBIEN) 5 mg tablet, Take 1 Tab by mouth nightly as needed for Sleep. Max Daily Amount: 5 mg., Disp: 30 Tab, Rfl: 2    sodium chloride (OCEAN NASAL) 0.65 % nasal squeeze bottle, Ocean Nasal 0.65 % spray aerosol  Take 1 spray 3 times a day by nasal route., Disp: , Rfl:     olopatadine (PATANOL) 0.1 % ophthalmic solution, olopatadine 0.1 % eye drops  INSTILL 1 DROP IN AFFECTED EYE(S) TWICE DAILY AS DIRECTED AT AN INTERVAL OF 6 TO 8 HOURS, Disp: , Rfl:     lisinopril (PRINIVIL, ZESTRIL) 10 mg tablet, TAKE 1 TABLET BY MOUTH DAILY, Disp: 90 Tab, Rfl: 3    amLODIPine (NORVASC) 10 mg tablet, TAKE 1 TABLET BY MOUTH DAILY, Disp: 90 Tab, Rfl: 3    azelastine (ASTELIN) 137 mcg (0.1 %) nasal spray, U 2 SPRAYS IEN BID, Disp: , Rfl: 3    propylene glycol (SYSTANE COMPLETE) 0.6 % drop, Apply  to eye., Disp: , Rfl:     GLUCOSAMINE HCL PO, Take  by mouth., Disp: , Rfl:     multivitamin, tx-iron-ca-min (THERA-M W/ IRON) 9 mg iron-400 mcg tab tablet, Take 1 Tab by mouth daily. , Disp: , Rfl:     cyanocobalamin 1,000 mcg tablet, Take 1,000 mcg by mouth daily. , Disp: , Rfl:     carboxymethylcellulose sodium (REFRESH LIQUIGEL) 1 % dlgl, Apply  to eye., Disp: , Rfl:     metroNIDAZOLE (METROGEL) 1 % topical gel, Apply  to affected area daily.  Use a thin layer to affected areas after washing, Disp: , Rfl:     clindamycin phosphate 1 % swab, by Apply Externally route., Disp: , Rfl:     desoximetasone (TOPICORT) 0.25 % topical cream, Apply  to affected area two (2) times a day., Disp: , Rfl:     levoFLOXacin (LEVAQUIN) 500 mg tablet, Take 1 Tab by mouth daily. , Disp: 10 Tab, Rfl: 0    levocetirizine (XYZAL) 5 mg tablet, TK 1 T PO QD, Disp: , Rfl: 1    montelukast (SINGULAIR) 10 mg tablet, TAKE 1 TABLET BY MOUTH DAILY, Disp: 90 Tab, Rfl: 3    fluticasone (FLONASE) 50 mcg/actuation nasal spray, 2 Sprays by Both Nostrils route daily. , Disp: , Rfl:     There were no vitals taken for this visit. ROS  See above  Physical Exam  Telephone visit only  ASSESSMENT and PLAN  Chronic cough - no timproved with change in allergy meds. ? ACE-I cough. Stop lisinopril and start Losartan. Call if no improvement.     Orders Placed This Encounter    ipratropium (ATROVENT) 42 mcg (0.06 %) nasal spray    DISCONTD: Cetirizine (ZyrTEC) 10 mg cap    losartan (COZAAR) 50 mg tablet

## 2020-06-22 ENCOUNTER — OFFICE VISIT (OUTPATIENT)
Dept: INTERNAL MEDICINE CLINIC | Age: 70
End: 2020-06-22

## 2020-06-22 VITALS — SYSTOLIC BLOOD PRESSURE: 134 MMHG | WEIGHT: 224 LBS | BODY MASS INDEX: 29.55 KG/M2 | DIASTOLIC BLOOD PRESSURE: 84 MMHG

## 2020-06-22 DIAGNOSIS — Z00.00 MEDICARE ANNUAL WELLNESS VISIT, SUBSEQUENT: Primary | ICD-10-CM

## 2020-06-22 DIAGNOSIS — E66.3 OVERWEIGHT: ICD-10-CM

## 2020-06-22 DIAGNOSIS — I10 ESSENTIAL HYPERTENSION: ICD-10-CM

## 2020-06-22 DIAGNOSIS — E78.00 PURE HYPERCHOLESTEROLEMIA: ICD-10-CM

## 2020-06-22 DIAGNOSIS — R73.9 ELEVATED BLOOD SUGAR: ICD-10-CM

## 2020-06-22 DIAGNOSIS — Z71.89 ADVANCE DIRECTIVE DISCUSSED WITH PATIENT: ICD-10-CM

## 2020-06-22 RX ORDER — LOSARTAN POTASSIUM 100 MG/1
100 TABLET ORAL DAILY
Qty: 90 TAB | Refills: 3 | Status: SHIPPED | OUTPATIENT
Start: 2020-06-22 | End: 2021-04-07 | Stop reason: SDUPTHER

## 2020-06-22 NOTE — PROGRESS NOTES
Consent: Urvashi Lamb, who was seen by synchronous (real-time) audio only technology, and/or his healthcare decision maker, is aware that this patient-initiated, Telehealth encounter on 6/22/2020 is a billable service, with coverage as determined by his insurance carrier. He is aware that he may receive a bill and has provided verbal consent to proceed: Yes. I was at home while conducting this encounter. Consent:  He and/or his healthcare decision maker is aware that this patient-initiated Telehealth encounter is a billable service, with coverage as determined by his insurance carrier. He is aware that he may receive a bill and has provided verbal consent to proceed: Yes    This virtual visit was conducted telephone encounter only. -  I affirm this is a Patient Initiated Episode with an Established Patient who has not had a related appointment within my department in the past 7 days or scheduled within the next 24 hours. Note: this encounter is not billable if this call serves to triage the patient into an appointment for the relevant concern. Total Time: minutes: 11-20 minutes for discussion of HTN, HLD and weight. This does not include time for MWE. Subjective:     Urvashi Lamb is a 78 y.o. male who presents for follow up of hypertension and HLD. Diet and Lifestyle: generally follows a low sodium diet  Walks 2 miles per day plus plays golf. Home BP Monitoring: is well controlled at home, ranging 130's/80's previously running 110/60s    Cardiovascular ROS: taking medications as instructed, no medication side effects noted, no TIA's, no chest pain on exertion, no dyspnea on exertion, no swelling of ankles, no orthostatic dizziness or lightheadedness, no palpitations. New concerns:   Cough improved wth change in BP meds from lisinopril to losartan. Would like to gain weight. In HS weighed 190lbs. 5 years ago weighed 210-215. Now up to 224lbs. Feels doesn't eat that bad.   More sedentary - walked more when he lived in [facility]. Had colonoscopy in [place] which was normal.    Small cyst right axilla. Not tender. No erythema. Has been present for several years. Current Outpatient Medications   Medication Sig Dispense Refill    losartan (COZAAR) 100 mg tablet Take 1 Tab by mouth daily. 90 Tab 3    zolpidem (AMBIEN) 5 mg tablet Take 1 Tab by mouth nightly as needed for Sleep. Max Daily Amount: 5 mg. 30 Tab 2    ipratropium (ATROVENT) 42 mcg (0.06 %) nasal spray U 2 SPRAYS IEN TID      Dexilant 60 mg CpDB capsule (delayed release) TAKE 1 CAPSULE BY MOUTH EVERY 48 HOURS 15 Cap 11    ProAir HFA 90 mcg/actuation inhaler INHALE 1 PUFF BY MOUTH EVERY 6 HOURS AS NEEDED FOR WHEEZING 8.5 g 3    hydroCHLOROthiazide (HYDRODIURIL) 25 mg tablet TAKE 1 TABLET BY MOUTH DAILY 90 Tab 3    diclofenac EC (VOLTAREN) 75 mg EC tablet TAKE 1 TABLET BY MOUTH TWICE DAILY 60 Tab 5    metoprolol succinate (TOPROL-XL) 100 mg tablet Take 2 Tabs by mouth daily. 180 Tab 3    sertraline (ZOLOFT) 50 mg tablet Take 1 Tab by mouth daily. TAKE 1 TABLET BY MOUTH EVERY DAY 90 Tab 3    atorvastatin (LIPITOR) 10 mg tablet TAKE 1 TABLET BY MOUTH DAILY 90 Tab 3    sodium chloride (OCEAN NASAL) 0.65 % nasal squeeze bottle Ocean Nasal 0.65 % spray aerosol   Take 1 spray 3 times a day by nasal route.  olopatadine (PATANOL) 0.1 % ophthalmic solution olopatadine 0.1 % eye drops   INSTILL 1 DROP IN AFFECTED EYE(S) TWICE DAILY AS DIRECTED AT AN INTERVAL OF 6 TO 8 HOURS      levocetirizine (XYZAL) 5 mg tablet TK 1 T PO QD  1    amLODIPine (NORVASC) 10 mg tablet TAKE 1 TABLET BY MOUTH DAILY 90 Tab 3    azelastine (ASTELIN) 137 mcg (0.1 %) nasal spray U 2 SPRAYS IEN BID  3    propylene glycol (SYSTANE COMPLETE) 0.6 % drop Apply  to eye.  GLUCOSAMINE HCL PO Take  by mouth.  multivitamin, tx-iron-ca-min (THERA-M W/ IRON) 9 mg iron-400 mcg tab tablet Take 1 Tab by mouth daily.       cyanocobalamin 1,000 mcg tablet Take 1,000 mcg by mouth daily.  carboxymethylcellulose sodium (REFRESH LIQUIGEL) 1 % dlgl Apply  to eye.  metroNIDAZOLE (METROGEL) 1 % topical gel Apply  to affected area daily. Use a thin layer to affected areas after washing      clindamycin phosphate 1 % swab by Apply Externally route.  desoximetasone (TOPICORT) 0.25 % topical cream Apply  to affected area two (2) times a day. Lab Results   Component Value Date/Time    Hemoglobin A1c 5.6 05/30/2019 11:08 AM    Hemoglobin A1c 5.7 (H) 12/14/2018 10:31 AM    Glucose 92 05/30/2019 11:08 AM    LDL, calculated 68 05/30/2019 11:08 AM    Creatinine 1.38 (H) 05/30/2019 11:08 AM      Lab Results   Component Value Date/Time    Cholesterol, total 159 05/30/2019 11:08 AM    HDL Cholesterol 63 05/30/2019 11:08 AM    LDL, calculated 68 05/30/2019 11:08 AM    Triglyceride 140 05/30/2019 11:08 AM     Lab Results   Component Value Date/Time    ALT (SGPT) 17 05/30/2019 11:08 AM    Alk. phosphatase 65 05/30/2019 11:08 AM    Bilirubin, direct 0.17 05/15/2018 09:02 AM    Bilirubin, total 0.4 05/30/2019 11:08 AM    Albumin 4.5 05/30/2019 11:08 AM    Protein, total 6.8 05/30/2019 11:08 AM     Lab Results   Component Value Date/Time    GFR est non-AA 52 (L) 05/30/2019 11:08 AM    GFR est AA 60 05/30/2019 11:08 AM    Creatinine 1.38 (H) 05/30/2019 11:08 AM    BUN 25 05/30/2019 11:08 AM    Sodium 140 05/30/2019 11:08 AM    Potassium 4.4 05/30/2019 11:08 AM    Chloride 102 05/30/2019 11:08 AM    CO2 22 05/30/2019 11:08 AM        Review of Systems, additional:  Pertinent items are noted in HPI. Objective:     Visit Vitals  /84   Wt 224 lb (101.6 kg)   BMI 29.55 kg/m²     Appearance: alert,oriented to person, place, and time. General exam:   .telephone visit only. Assessment/Plan:     hypertension not as well controlled with change to losartan  Continue rest of meds but will increase losartan to 100mg every day.   .  Check labs    Hyperlipidemia- controlled in past  Continue same   Check labs    Elevated BS - check labs  Discussed weight loss    Overweight - discussed weight loss through improved diet and exercise. Orders Placed This Encounter    METABOLIC PANEL, COMPREHENSIVE    LIPID PANEL    HEMOGLOBIN A1C WITH EAG    losartan (COZAAR) 100 mg tablet            This is the Subsequent Medicare Annual Wellness Exam, performed 12 months or more after the Initial AWV or the last Subsequent AWV    I have reviewed the patient's medical history in detail and updated the computerized patient record. History     Patient Active Problem List   Diagnosis Code    Dry eye H04.129    Allergic rhinitis J30.9    Essential hypertension I10    Pure hypercholesterolemia E78.00    Gastroesophageal reflux disease K21.9    Advance directive discussed with patient Z70.80    Vitreous floaters of right eye H43.391    Lesion of upper eyelid H02.9    Dry eye syndrome of both lacrimal glands H04.123    Bilateral presbyopia H52.4    Age-related nuclear cataract of both eyes H25.13     Past Medical History:   Diagnosis Date    Allergic rhinitis     Cancer ([facility] [address]     prostate ca - diagnosed age 53    Dry eye     GERD (gastroesophageal reflux disease)     Hypercholesterolemia     Hypertension       Past Surgical History:   Procedure Laterality Date    HX PROSTATECTOMY  2006    HX TONSILLECTOMY       Current Outpatient Medications   Medication Sig Dispense Refill    losartan (COZAAR) 100 mg tablet Take 1 Tab by mouth daily. 90 Tab 3    zolpidem (AMBIEN) 5 mg tablet Take 1 Tab by mouth nightly as needed for Sleep.  Max Daily Amount: 5 mg. 30 Tab 2    ipratropium (ATROVENT) 42 mcg (0.06 %) nasal spray U 2 SPRAYS IEN TID      Dexilant 60 mg CpDB capsule (delayed release) TAKE 1 CAPSULE BY MOUTH EVERY 48 HOURS 15 Cap 11    ProAir HFA 90 mcg/actuation inhaler INHALE 1 PUFF BY MOUTH EVERY 6 HOURS AS NEEDED FOR WHEEZING 8.5 g 3    hydroCHLOROthiazide (HYDRODIURIL) 25 mg tablet TAKE 1 TABLET BY MOUTH DAILY 90 Tab 3    diclofenac EC (VOLTAREN) 75 mg EC tablet TAKE 1 TABLET BY MOUTH TWICE DAILY 60 Tab 5    metoprolol succinate (TOPROL-XL) 100 mg tablet Take 2 Tabs by mouth daily. 180 Tab 3    sertraline (ZOLOFT) 50 mg tablet Take 1 Tab by mouth daily. TAKE 1 TABLET BY MOUTH EVERY DAY 90 Tab 3    atorvastatin (LIPITOR) 10 mg tablet TAKE 1 TABLET BY MOUTH DAILY 90 Tab 3    sodium chloride (OCEAN NASAL) 0.65 % nasal squeeze bottle Ocean Nasal 0.65 % spray aerosol   Take 1 spray 3 times a day by nasal route.  olopatadine (PATANOL) 0.1 % ophthalmic solution olopatadine 0.1 % eye drops   INSTILL 1 DROP IN AFFECTED EYE(S) TWICE DAILY AS DIRECTED AT AN INTERVAL OF 6 TO 8 HOURS      levocetirizine (XYZAL) 5 mg tablet TK 1 T PO QD  1    amLODIPine (NORVASC) 10 mg tablet TAKE 1 TABLET BY MOUTH DAILY 90 Tab 3    azelastine (ASTELIN) 137 mcg (0.1 %) nasal spray U 2 SPRAYS IEN BID  3    propylene glycol (SYSTANE COMPLETE) 0.6 % drop Apply  to eye.  GLUCOSAMINE HCL PO Take  by mouth.  multivitamin, tx-iron-ca-min (THERA-M W/ IRON) 9 mg iron-400 mcg tab tablet Take 1 Tab by mouth daily.  cyanocobalamin 1,000 mcg tablet Take 1,000 mcg by mouth daily.  carboxymethylcellulose sodium (REFRESH LIQUIGEL) 1 % dlgl Apply  to eye.  metroNIDAZOLE (METROGEL) 1 % topical gel Apply  to affected area daily. Use a thin layer to affected areas after washing      clindamycin phosphate 1 % swab by Apply Externally route.  desoximetasone (TOPICORT) 0.25 % topical cream Apply  to affected area two (2) times a day. No Known Allergies    Family History   Problem Relation Age of Onset   [facility] Arthritis-rheumatoid Father     Cancer Father         prostate ca    Diabetes Paternal Grandfather      Social History     Tobacco Use    Smoking status: Never Smoker    Smokeless tobacco: Never Used   Substance Use Topics    Alcohol use:  Yes Depression Risk Factor Screening:     3 most recent PHQ Screens 6/22/2020   Little interest or pleasure in doing things Not at all   Feeling down, depressed, irritable, or hopeless Not at all   Total Score PHQ 2 0       Alcohol Risk Factor Screening (MALE > 65): Do you average more 1 drink per night or more than 7 drinks a week: Yes    In the past three months have you have had more than 4 drinks containing alcohol on one occasion: No      Functional Ability and Level of Safety:   Hearing: Hearing is good. Activities of Daily Living: The home contains: no safety equipment. Patient does total self care     Ambulation: with no difficulty     Fall Risk:  Fall Risk Assessment, last 12 mths 6/22/2020   Able to walk? Yes   Fall in past 12 months? No     Abuse Screen:  Patient is not abused       Cognitive Screening   Has your family/caregiver stated any concerns about your memory: no     Cognitive Screening: Normal -      Patient Care Team   Patient Care Team:  Jay Sanchez MD as PCP - General (Internal Medicine)  Jay Sanchez MD as PCP - [facility] Empaneled Provider    Assessment/Plan   Education and counseling provided:  Are appropriate based on today's review and evaluation   Advanced directive discussed with patient. Diagnoses and all orders for this visit:    1. Essential hypertension  -     METABOLIC PANEL, COMPREHENSIVE; Future  -     LIPID PANEL; Future  -     HEMOGLOBIN A1C WITH EAG; Future    2. Pure hypercholesterolemia  -     METABOLIC PANEL, COMPREHENSIVE; Future  -     LIPID PANEL; Future  -     HEMOGLOBIN A1C WITH EAG; Future    3. Medicare annual wellness visit, subsequent    4. Elevated blood sugar  -     METABOLIC PANEL, COMPREHENSIVE; Future  -     LIPID PANEL; Future  -     HEMOGLOBIN A1C WITH EAG; Future    Other orders  -     losartan (COZAAR) 100 mg tablet; Take 1 Tab by mouth daily. Health Maintenance Due   Topic Date Due    GLAUCOMA SCREENING Q2Y  Up to date.       Medicare Yearly Exam  06/22/2021    Lipid Screen  Ordered today

## 2020-06-22 NOTE — PATIENT INSTRUCTIONS
Medicare Wellness Visit, Male The best way to live healthy is to have a lifestyle where you eat a well-balanced diet, exercise regularly, limit alcohol use, and quit all forms of tobacco/nicotine, if applicable. Regular preventive services are another way to keep healthy. Preventive services (vaccines, screening tests, monitoring & exams) can help personalize your care plan, which helps you manage your own care. Screening tests can find health problems at the earliest stages, when they are easiest to treat. [facility] follows the current, evidence-based guidelines published by the [facility] ([facility]) when recommending preventive services for our patients. Because we follow these guidelines, sometimes recommendations change over time as research supports it. (For example, a prostate screening blood test is no longer routinely recommended for men with no symptoms). Of course, you and your doctor may decide to screen more often for some diseases, based on your risk and co-morbidities (chronic disease you are already diagnosed with). Preventive services for you include: - Medicare offers their members a free annual wellness visit, which is time for you and your primary care provider to discuss and plan for your preventive service needs. Take advantage of this benefit every year! 
-All adults over age 72 should receive the recommended pneumonia vaccines. Current USPSTF guidelines recommend a series of two vaccines for the best pneumonia protection.  
-All adults should have a flu vaccine yearly and tetanus vaccine every 10 years. 
-All adults age 48 and older should receive the shingles vaccines (series of two vaccines).       
-All adults age 38-68 who are overweight should have a diabetes screening test once every three years.  
-Other screening tests & preventive services for persons with diabetes include: an eye exam to screen for diabetic retinopathy, a kidney function test, a foot exam, and stricter control over your cholesterol.  
-Cardiovascular screening for adults with routine risk involves an electrocardiogram (ECG) at intervals determined by the provider.  
-Colorectal cancer screening should be done for adults age 54-65 with no increased risk factors for colorectal cancer. There are a number of acceptable methods of screening for this type of cancer. Each test has its own benefits and drawbacks. Discuss with your provider what is most appropriate for you during your annual wellness visit. The different tests include: colonoscopy (considered the best screening method), a fecal occult blood test, a fecal DNA test, and sigmoidoscopy. 
-All adults born between [facility] should be screened once for Hepatitis C. 
-An Abdominal Aortic Aneurysm (AAA) Screening is recommended for men age 73-68 who has ever smoked in their lifetime. Here is a list of your current Health Maintenance items (your personalized list of preventive services) with a due date: 
 
 
Health Maintenance Due Topic Date Due  GLAUCOMA SCREENING Q2Y  Up to date.  Medicare Yearly Exam  06/22/2021  Lipid Screen  Ordered today

## 2020-06-23 ENCOUNTER — HOSPITAL ENCOUNTER (OUTPATIENT)
Dept: LAB | Age: 70
Discharge: HOME | End: 2020-06-23

## 2020-06-23 DIAGNOSIS — R73.9 ELEVATED BLOOD SUGAR: ICD-10-CM

## 2020-06-23 DIAGNOSIS — E78.00 PURE HYPERCHOLESTEROLEMIA: ICD-10-CM

## 2020-06-23 DIAGNOSIS — I10 ESSENTIAL HYPERTENSION: ICD-10-CM

## 2020-06-23 LAB
ALBUMIN SERPL-MCNC: 4.1 G/DL (ref 3.5–5)
ALBUMIN/GLOB SERPL: 1.3 {RATIO} (ref 1.1–2.2)
ALP SERPL-CCNC: 70 U/L (ref 45–117)
ALT SERPL-CCNC: 36 U/L (ref 12–78)
ANION GAP SERPL CALC-SCNC: 8 MMOL/L (ref 5–15)
AST SERPL-CCNC: 23 U/L (ref 15–37)
BILIRUB SERPL-MCNC: 0.6 MG/DL (ref 0.2–1)
BUN SERPL-MCNC: 23 MG/DL (ref 6–20)
BUN/CREAT SERPL: 19 (ref 12–20)
CALCIUM SERPL-MCNC: 9.3 MG/DL (ref 8.5–10.1)
CHLORIDE SERPL-SCNC: 101 MMOL/L (ref 97–108)
CHOLEST SERPL-MCNC: 201 MG/DL
CO2 SERPL-SCNC: 29 MMOL/L (ref 21–32)
CREAT SERPL-MCNC: 1.21 MG/DL (ref 0.7–1.3)
EST. AVERAGE GLUCOSE BLD GHB EST-MCNC: 108 MG/DL
GLOBULIN SER CALC-MCNC: 3.2 G/DL (ref 2–4)
GLUCOSE SERPL-MCNC: 91 MG/DL (ref 65–100)
HBA1C MFR BLD: 5.4 % (ref 4–5.6)
HDLC SERPL-MCNC: 74 MG/DL
HDLC SERPL: 2.7 {RATIO} (ref 0–5)
LDLC SERPL CALC-MCNC: 94.8 MG/DL (ref 0–100)
LIPID PROFILE,FLP: ABNORMAL
POTASSIUM SERPL-SCNC: 4 MMOL/L (ref 3.5–5.1)
PROT SERPL-MCNC: 7.3 G/DL (ref 6.4–8.2)
SODIUM SERPL-SCNC: 138 MMOL/L (ref 136–145)
TRIGL SERPL-MCNC: 161 MG/DL (ref ?–150)
VLDLC SERPL CALC-MCNC: 32.2 MG/DL

## 2020-07-06 NOTE — TELEPHONE ENCOUNTER
Call him. I dont think he needs this refill bc they are either in [place] or [place] and wont return to [place] until after xmas. Does he need a refill at a local pharmacy?

## 2020-07-07 RX ORDER — AMLODIPINE BESYLATE 10 MG/1
TABLET ORAL
Qty: 90 TAB | Refills: 3 | Status: SHIPPED | OUTPATIENT
Start: 2020-07-07 | End: 2020-07-08

## 2020-07-08 RX ORDER — AMLODIPINE BESYLATE 10 MG/1
TABLET ORAL
Qty: 90 TAB | Refills: 3 | Status: SHIPPED | OUTPATIENT
Start: 2020-07-08 | End: 2021-12-20

## 2020-09-15 ENCOUNTER — TELEPHONE (OUTPATIENT)
Dept: INTERNAL MEDICINE CLINIC | Age: 70
End: 2020-09-15

## 2020-09-15 RX ORDER — CEPHALEXIN 250 MG/1
250 CAPSULE ORAL 4 TIMES DAILY
Qty: 28 CAP | Refills: 0 | Status: SHIPPED | OUTPATIENT
Start: 2020-09-15 | End: 2020-09-22

## 2020-09-16 NOTE — TELEPHONE ENCOUNTER
----- Message from Jessie Lopez LPN sent at 1/63/6750  1:02 PM EDT -----  Regarding: FW: Non-Urgent Medical Question  Contact: [phone]    ----- Message -----  From: Naresh Werner  Sent: 9/15/2020  12:24 PM EDT  To: Josephine Benavides  Subject: Non-Urgent Medical Question                      Dr. Booth Founds: The cyst I mentioned to you when we last spoke has begun to flare up. Please see attachment for 2 photos. We are in [place] for 2 more weeks. Do you have any suggestions?   Thanks,  [employer]

## 2020-09-30 RX ORDER — DICLOFENAC SODIUM 75 MG/1
TABLET, DELAYED RELEASE ORAL
Qty: 60 TAB | Refills: 5 | Status: SHIPPED | OUTPATIENT
Start: 2020-09-30 | End: 2021-08-01

## 2020-10-14 RX ORDER — METOPROLOL SUCCINATE 100 MG/1
TABLET, EXTENDED RELEASE ORAL
Qty: 180 TAB | Refills: 3 | Status: SHIPPED | OUTPATIENT
Start: 2020-10-14 | End: 2021-10-24

## 2020-11-30 DIAGNOSIS — G47.00 INSOMNIA, UNSPECIFIED TYPE: ICD-10-CM

## 2020-11-30 RX ORDER — ZOLPIDEM TARTRATE 5 MG/1
5 TABLET ORAL
Qty: 30 TAB | Refills: 2 | Status: SHIPPED | OUTPATIENT
Start: 2020-11-30 | End: 2021-01-21 | Stop reason: SDUPTHER

## 2020-11-30 RX ORDER — ATORVASTATIN CALCIUM 10 MG/1
TABLET, FILM COATED ORAL
Qty: 90 TAB | Refills: 3 | Status: SHIPPED | OUTPATIENT
Start: 2020-11-30 | End: 2021-10-27 | Stop reason: SDUPTHER

## 2020-12-04 ENCOUNTER — OFFICE VISIT (OUTPATIENT)
Dept: INTERNAL MEDICINE CLINIC | Age: 70
End: 2020-12-04
Payer: MEDICARE

## 2020-12-04 VITALS
RESPIRATION RATE: 20 BRPM | DIASTOLIC BLOOD PRESSURE: 68 MMHG | HEART RATE: 58 BPM | WEIGHT: 229 LBS | HEIGHT: 73 IN | OXYGEN SATURATION: 97 % | BODY MASS INDEX: 30.35 KG/M2 | TEMPERATURE: 97.4 F | SYSTOLIC BLOOD PRESSURE: 113 MMHG

## 2020-12-04 DIAGNOSIS — I10 ESSENTIAL HYPERTENSION: Primary | ICD-10-CM

## 2020-12-04 DIAGNOSIS — E66.3 OVERWEIGHT: ICD-10-CM

## 2020-12-04 DIAGNOSIS — R73.9 ELEVATED BLOOD SUGAR: ICD-10-CM

## 2020-12-04 DIAGNOSIS — E78.00 PURE HYPERCHOLESTEROLEMIA: ICD-10-CM

## 2020-12-04 PROCEDURE — G8417 CALC BMI ABV UP PARAM F/U: HCPCS | Performed by: INTERNAL MEDICINE

## 2020-12-04 PROCEDURE — G8752 SYS BP LESS 140: HCPCS | Performed by: INTERNAL MEDICINE

## 2020-12-04 PROCEDURE — G8427 DOCREV CUR MEDS BY ELIG CLIN: HCPCS | Performed by: INTERNAL MEDICINE

## 2020-12-04 PROCEDURE — G8754 DIAS BP LESS 90: HCPCS | Performed by: INTERNAL MEDICINE

## 2020-12-04 PROCEDURE — 1101F PT FALLS ASSESS-DOCD LE1/YR: CPT | Performed by: INTERNAL MEDICINE

## 2020-12-04 PROCEDURE — 99214 OFFICE O/P EST MOD 30 MIN: CPT | Performed by: INTERNAL MEDICINE

## 2020-12-04 PROCEDURE — 3017F COLORECTAL CA SCREEN DOC REV: CPT | Performed by: INTERNAL MEDICINE

## 2020-12-04 PROCEDURE — G8432 DEP SCR NOT DOC, RNG: HCPCS | Performed by: INTERNAL MEDICINE

## 2020-12-04 PROCEDURE — G0463 HOSPITAL OUTPT CLINIC VISIT: HCPCS | Performed by: INTERNAL MEDICINE

## 2020-12-04 PROCEDURE — G8536 NO DOC ELDER MAL SCRN: HCPCS | Performed by: INTERNAL MEDICINE

## 2020-12-04 RX ORDER — CHOLECALCIFEROL (VITAMIN D3) 125 MCG
1 CAPSULE ORAL DAILY
COMMUNITY

## 2020-12-04 RX ORDER — MONTELUKAST SODIUM 10 MG/1
TABLET ORAL
COMMUNITY
Start: 2020-11-23 | End: 2021-02-28

## 2020-12-04 NOTE — PROGRESS NOTES
Subjective:     Da Castillo is a 69 y.o. male who presents for follow up of hypertension and hyperlipidemia and elevated blood sugar    Diet and Lifestyle: generally follows a low sodium diet, exercises regularly - walking 2 miles and playing golf. Home BP Monitoring: is not measured at home    Cardiovascular ROS: taking medications as instructed, no medication side effects noted, no TIA's, no chest pain on exertion, no dyspnea on exertion, no swelling of ankles, no orthostatic dizziness or lightheadedness, no palpitations. New concerns:   Slipped going to bathroom in the middle of the night, hit into wall on right side. Residual soreness right upper lateral chest. . Has upcoming appointment with Dr. Orion Etienne for eye exam.      Current Outpatient Medications   Medication Sig Dispense Refill    montelukast (SINGULAIR) 10 mg tablet       cholecalciferol, vitamin D3, (Vitamin D3) 50 mcg (2,000 unit) tab Take 1 Tab by mouth daily.  atorvastatin (LIPITOR) 10 mg tablet TAKE 1 TABLET BY MOUTH DAILY 90 Tab 3    zolpidem (AMBIEN) 5 mg tablet Take 1 Tab by mouth nightly as needed for Sleep. Max Daily Amount: 5 mg. 30 Tab 2    metoprolol succinate (TOPROL-XL) 100 mg tablet TAKE 2 TABLETS BY MOUTH DAILY 180 Tab 3    diclofenac EC (VOLTAREN) 75 mg EC tablet TAKE 1 TABLET BY MOUTH TWICE DAILY 60 Tab 5    amLODIPine (NORVASC) 10 mg tablet TAKE 1 TABLET BY MOUTH DAILY 90 Tab 3    losartan (COZAAR) 100 mg tablet Take 1 Tab by mouth daily. 90 Tab 3    ipratropium (ATROVENT) 42 mcg (0.06 %) nasal spray U 2 SPRAYS IEN TID      Dexilant 60 mg CpDB capsule (delayed release) TAKE 1 CAPSULE BY MOUTH EVERY 48 HOURS 15 Cap 11    hydroCHLOROthiazide (HYDRODIURIL) 25 mg tablet TAKE 1 TABLET BY MOUTH DAILY 90 Tab 3    sertraline (ZOLOFT) 50 mg tablet Take 1 Tab by mouth daily.  TAKE 1 TABLET BY MOUTH EVERY DAY 90 Tab 3    sodium chloride (OCEAN NASAL) 0.65 % nasal squeeze bottle Ocean Nasal 0.65 % spray aerosol Take 1 spray 3 times a day by nasal route.  olopatadine (PATANOL) 0.1 % ophthalmic solution olopatadine 0.1 % eye drops   INSTILL 1 DROP IN AFFECTED EYE(S) TWICE DAILY AS DIRECTED AT AN INTERVAL OF 6 TO 8 HOURS      levocetirizine (XYZAL) 5 mg tablet TK 1 T PO QD  1    azelastine (ASTELIN) 137 mcg (0.1 %) nasal spray U 2 SPRAYS IEN BID  3    multivitamin, tx-iron-ca-min (THERA-M W/ IRON) 9 mg iron-400 mcg tab tablet Take 1 Tab by mouth daily.  cyanocobalamin 1,000 mcg tablet Take 1,000 mcg by mouth daily.  metroNIDAZOLE (METROGEL) 1 % topical gel Apply  to affected area daily. Use a thin layer to affected areas after washing      clindamycin phosphate 1 % swab by Apply Externally route.  desoximetasone (TOPICORT) 0.25 % topical cream Apply  to affected area two (2) times a day. Lab Results   Component Value Date/Time    Hemoglobin A1c 5.4 06/23/2020 10:50 AM    Hemoglobin A1c 5.6 05/30/2019 11:08 AM    Hemoglobin A1c 5.7 (H) 12/14/2018 10:31 AM    Glucose 91 06/23/2020 10:50 AM    LDL, calculated 94.8 06/23/2020 10:50 AM    Creatinine 1.21 06/23/2020 10:50 AM      Lab Results   Component Value Date/Time    Cholesterol, total 201 (H) 06/23/2020 10:50 AM    HDL Cholesterol 74 06/23/2020 10:50 AM    LDL, calculated 94.8 06/23/2020 10:50 AM    Triglyceride 161 (H) 06/23/2020 10:50 AM    CHOL/HDL Ratio 2.7 06/23/2020 10:50 AM     Lab Results   Component Value Date/Time    ALT (SGPT) 36 06/23/2020 10:50 AM    Alk.  phosphatase 70 06/23/2020 10:50 AM    Bilirubin, direct 0.17 05/15/2018 09:02 AM    Bilirubin, total 0.6 06/23/2020 10:50 AM    Albumin 4.1 06/23/2020 10:50 AM    Protein, total 7.3 06/23/2020 10:50 AM     Lab Results   Component Value Date/Time    GFR est non-AA 59 (L) 06/23/2020 10:50 AM    GFR est AA >60 06/23/2020 10:50 AM    Creatinine 1.21 06/23/2020 10:50 AM    BUN 23 (H) 06/23/2020 10:50 AM    Sodium 138 06/23/2020 10:50 AM    Potassium 4.0 06/23/2020 10:50 AM    Chloride 101 06/23/2020 10:50 AM    CO2 29 06/23/2020 10:50 AM        Review of Systems, additional:  Pertinent items are noted in HPI. Objective:     Visit Vitals  /68   Pulse (!) 58   Temp 97.4 °F (36.3 °C) (Temporal)   Resp 20   Ht 6' 1\" (1.854 m)   Wt 229 lb (103.9 kg)   SpO2 97%   BMI 30.21 kg/m²     Appearance: alert, well appearing, and in no distress and oriented to person, place, and time. General exam:   . NECK: supple, no lad, no bruit, no tm  LUNGS: cta bilat  CV rrr, no m/g/r  ABD: soft, nt, nd, nabs  EXT: no c/c/e    Assessment/Plan:     hypertension well controlled. current treatment plan is effective, no change in therapy. Hyperlipidemia - controlled in past  Check labs, cont same meds    Elevated blood sugar -  Check labs cont same    Obesity - discussed diet and exercise for weight loss      Orders Placed This Encounter    LIPID PANEL    montelukast (SINGULAIR) 10 mg tablet    cholecalciferol, vitamin D3, (Vitamin D3) 50 mcg (2,000 unit) tab     Follow-up and Dispositions    · Return in about 6 months (around 6/4/2021).

## 2020-12-04 NOTE — PROGRESS NOTES
Flu shot given at [facility]. Patient states he fell couple days ago and has pain on right flank area, got up in the middle of the night and had a mis step.

## 2020-12-08 DIAGNOSIS — E78.00 PURE HYPERCHOLESTEROLEMIA: ICD-10-CM

## 2020-12-08 LAB
CHOLEST SERPL-MCNC: 178 MG/DL
HDLC SERPL-MCNC: 71 MG/DL
HDLC SERPL: 2.5 {RATIO} (ref 0–5)
LDLC SERPL CALC-MCNC: 73.4 MG/DL (ref 0–100)
LIPID PROFILE,FLP: ABNORMAL
TRIGL SERPL-MCNC: 168 MG/DL (ref ?–150)
VLDLC SERPL CALC-MCNC: 33.6 MG/DL

## 2021-01-21 DIAGNOSIS — G47.00 INSOMNIA, UNSPECIFIED TYPE: ICD-10-CM

## 2021-01-21 RX ORDER — ZOLPIDEM TARTRATE 5 MG/1
5 TABLET ORAL
Qty: 30 TAB | Refills: 2 | Status: SHIPPED | OUTPATIENT
Start: 2021-01-21 | End: 2021-04-19

## 2021-02-25 RX ORDER — SERTRALINE HYDROCHLORIDE 50 MG/1
TABLET, FILM COATED ORAL
Qty: 90 TAB | Refills: 3 | Status: SHIPPED | OUTPATIENT
Start: 2021-02-25 | End: 2021-05-30

## 2021-04-07 RX ORDER — LOSARTAN POTASSIUM 100 MG/1
100 TABLET ORAL DAILY
Qty: 90 TAB | Refills: 3 | Status: SHIPPED | OUTPATIENT
Start: 2021-04-07 | End: 2021-12-30 | Stop reason: SDUPTHER

## 2021-05-05 RX ORDER — HYDROCHLOROTHIAZIDE 25 MG/1
TABLET ORAL
Qty: 90 TAB | Refills: 3 | Status: SHIPPED | OUTPATIENT
Start: 2021-05-05 | End: 2022-02-17 | Stop reason: SDUPTHER

## 2021-05-30 RX ORDER — SERTRALINE HYDROCHLORIDE 50 MG/1
TABLET, FILM COATED ORAL
Qty: 90 TABLET | Refills: 3 | Status: SHIPPED | OUTPATIENT
Start: 2021-05-30 | End: 2022-05-04

## 2021-06-04 ENCOUNTER — OFFICE VISIT (OUTPATIENT)
Dept: INTERNAL MEDICINE CLINIC | Age: 71
End: 2021-06-04
Payer: MEDICARE

## 2021-06-04 VITALS
HEIGHT: 73 IN | WEIGHT: 221.4 LBS | BODY MASS INDEX: 29.34 KG/M2 | SYSTOLIC BLOOD PRESSURE: 122 MMHG | DIASTOLIC BLOOD PRESSURE: 62 MMHG | HEART RATE: 54 BPM | TEMPERATURE: 98 F | RESPIRATION RATE: 18 BRPM

## 2021-06-04 DIAGNOSIS — R73.9 ELEVATED BLOOD SUGAR: ICD-10-CM

## 2021-06-04 DIAGNOSIS — Z71.89 ADVANCE DIRECTIVE DISCUSSED WITH PATIENT: ICD-10-CM

## 2021-06-04 DIAGNOSIS — Z00.00 MEDICARE ANNUAL WELLNESS VISIT, SUBSEQUENT: ICD-10-CM

## 2021-06-04 DIAGNOSIS — I10 ESSENTIAL HYPERTENSION: Primary | ICD-10-CM

## 2021-06-04 DIAGNOSIS — E78.00 PURE HYPERCHOLESTEROLEMIA: ICD-10-CM

## 2021-06-04 PROCEDURE — 99214 OFFICE O/P EST MOD 30 MIN: CPT | Performed by: INTERNAL MEDICINE

## 2021-06-04 PROCEDURE — 1101F PT FALLS ASSESS-DOCD LE1/YR: CPT | Performed by: INTERNAL MEDICINE

## 2021-06-04 PROCEDURE — G8417 CALC BMI ABV UP PARAM F/U: HCPCS | Performed by: INTERNAL MEDICINE

## 2021-06-04 PROCEDURE — G0463 HOSPITAL OUTPT CLINIC VISIT: HCPCS | Performed by: INTERNAL MEDICINE

## 2021-06-04 PROCEDURE — G0439 PPPS, SUBSEQ VISIT: HCPCS | Performed by: INTERNAL MEDICINE

## 2021-06-04 PROCEDURE — G8536 NO DOC ELDER MAL SCRN: HCPCS | Performed by: INTERNAL MEDICINE

## 2021-06-04 PROCEDURE — G8510 SCR DEP NEG, NO PLAN REQD: HCPCS | Performed by: INTERNAL MEDICINE

## 2021-06-04 PROCEDURE — G8752 SYS BP LESS 140: HCPCS | Performed by: INTERNAL MEDICINE

## 2021-06-04 PROCEDURE — G8754 DIAS BP LESS 90: HCPCS | Performed by: INTERNAL MEDICINE

## 2021-06-04 PROCEDURE — 3017F COLORECTAL CA SCREEN DOC REV: CPT | Performed by: INTERNAL MEDICINE

## 2021-06-04 PROCEDURE — G8427 DOCREV CUR MEDS BY ELIG CLIN: HCPCS | Performed by: INTERNAL MEDICINE

## 2021-06-04 NOTE — PROGRESS NOTES
Subjective:     Denis Samuels is a 69 y.o. male who presents for follow up of hypertension and hyperlipidemia. Diet and Lifestyle: more careful with diet, not exercising as much as he should. Has lost 8 lbs since December. Home BP Monitoring: is not measured at home    Cardiovascular ROS: taking medications as instructed, no medication side effects noted, no TIA's, no chest pain on exertion, no dyspnea on exertion, no swelling of ankles, no orthostatic dizziness or lightheadedness, no palpitations. New concerns: In the last month doesn't feel as steady. No dizziness r lioghtheadedness. No weakness or numbness in legs. . no back pain. No falls or near falls. Current Outpatient Medications   Medication Sig Dispense Refill    sertraline (ZOLOFT) 50 mg tablet TAKE 1 TABLET BY MOUTH EVERY DAY 90 Tablet 3    hydroCHLOROthiazide (HYDRODIURIL) 25 mg tablet TAKE 1 TABLET BY MOUTH DAILY 90 Tab 3    zolpidem (AMBIEN) 5 mg tablet TAKE 1 TABLET BY MOUTH EVERY NIGHT AS NEEDED FOR SLEEP. MAX DAILY AMOUNT: 5 MG 30 Tab 5    losartan (COZAAR) 100 mg tablet Take 1 Tab by mouth daily. 90 Tab 3    montelukast (SINGULAIR) 10 mg tablet TAKE 1 TABLET BY MOUTH DAILY 90 Tab 3    cholecalciferol, vitamin D3, (Vitamin D3) 50 mcg (2,000 unit) tab Take 1 Tab by mouth daily.       atorvastatin (LIPITOR) 10 mg tablet TAKE 1 TABLET BY MOUTH DAILY 90 Tab 3    metoprolol succinate (TOPROL-XL) 100 mg tablet TAKE 2 TABLETS BY MOUTH DAILY 180 Tab 3    diclofenac EC (VOLTAREN) 75 mg EC tablet TAKE 1 TABLET BY MOUTH TWICE DAILY 60 Tab 5    amLODIPine (NORVASC) 10 mg tablet TAKE 1 TABLET BY MOUTH DAILY 90 Tab 3    ipratropium (ATROVENT) 42 mcg (0.06 %) nasal spray U 2 SPRAYS IEN TID      Dexilant 60 mg CpDB capsule (delayed release) TAKE 1 CAPSULE BY MOUTH EVERY 48 HOURS 15 Cap 11    olopatadine (PATANOL) 0.1 % ophthalmic solution olopatadine 0.1 % eye drops   INSTILL 1 DROP IN AFFECTED EYE(S) TWICE DAILY AS DIRECTED AT AN INTERVAL OF 6 TO 8 HOURS      levocetirizine (XYZAL) 5 mg tablet TK 1 T PO QD  1    azelastine (ASTELIN) 137 mcg (0.1 %) nasal spray U 2 SPRAYS IEN BID  3    multivitamin, tx-iron-ca-min (THERA-M W/ IRON) 9 mg iron-400 mcg tab tablet Take 1 Tab by mouth daily.  cyanocobalamin 1,000 mcg tablet Take 1,000 mcg by mouth daily.  metroNIDAZOLE (METROGEL) 1 % topical gel Apply  to affected area daily. Use a thin layer to affected areas after washing      clindamycin phosphate 1 % swab by Apply Externally route.  desoximetasone (TOPICORT) 0.25 % topical cream Apply  to affected area two (2) times a day. Lab Results   Component Value Date/Time    Hemoglobin A1c 5.4 06/23/2020 10:50 AM    Hemoglobin A1c 5.6 05/30/2019 11:08 AM    Hemoglobin A1c 5.7 (H) 12/14/2018 10:31 AM    Glucose 91 06/23/2020 10:50 AM    LDL, calculated 73.4 12/08/2020 10:04 AM    Creatinine 1.21 06/23/2020 10:50 AM      Lab Results   Component Value Date/Time    Cholesterol, total 178 12/08/2020 10:04 AM    HDL Cholesterol 71 12/08/2020 10:04 AM    LDL, calculated 73.4 12/08/2020 10:04 AM    Triglyceride 168 (H) 12/08/2020 10:04 AM    CHOL/HDL Ratio 2.5 12/08/2020 10:04 AM     Lab Results   Component Value Date/Time    ALT (SGPT) 36 06/23/2020 10:50 AM    Alk. phosphatase 70 06/23/2020 10:50 AM    Bilirubin, direct 0.17 05/15/2018 09:02 AM    Bilirubin, total 0.6 06/23/2020 10:50 AM    Albumin 4.1 06/23/2020 10:50 AM    Protein, total 7.3 06/23/2020 10:50 AM     Lab Results   Component Value Date/Time    GFR est non-AA 59 (L) 06/23/2020 10:50 AM    GFR est AA >60 06/23/2020 10:50 AM    Creatinine 1.21 06/23/2020 10:50 AM    BUN 23 (H) 06/23/2020 10:50 AM    Sodium 138 06/23/2020 10:50 AM    Potassium 4.0 06/23/2020 10:50 AM    Chloride 101 06/23/2020 10:50 AM    CO2 29 06/23/2020 10:50 AM        Review of Systems, additional:  Pertinent items are noted in HPI.     Objective:     Visit Vitals  /62 (BP 1 Location: Left upper arm, BP Patient Position: Sitting, BP Cuff Size: Large adult)   Pulse (!) 54   Temp 98 °F (36.7 °C) (Temporal)   Resp 18   Ht 6' 1\" (1.854 m)   Wt 221 lb 6.4 oz (100.4 kg)   BMI 29.21 kg/m²     Appearance: alert, well appearing, and in no distress and oriented to person, place, and time. General exam:   . NECK: supple, no lad, no bruit, no tm  LUNGS: cta bilat  CV rrr, no m/g/r  ABD: soft, nt, nd, nabs  EXT: no c/c/e    Assessment/Plan:     hypertension well controlled. current treatment plan is effective, no change in therapy. '    HLD  - controlled, cont same  Repeat labs    Elevated blood sugar - controlled. Weight loss and improved diet should have helped. Repeat labs  Orders Placed This Encounter    METABOLIC PANEL, COMPREHENSIVE    LIPID PANEL    HEMOGLOBIN A1C WITH EAG          This is the Subsequent Medicare Annual Wellness Exam, performed 12 months or more after the Initial AWV or the last Subsequent AWV    I have reviewed the patient's medical history in detail and updated the computerized patient record. Assessment/Plan   Education and counseling provided:  Are appropriate based on today's review and evaluation   Advanced directive discussed withpatient. 1. Medicare annual wellness visit, subsequent       Depression Risk Factor Screening     3 most recent PHQ Screens 6/4/2021   Little interest or pleasure in doing things Not at all   Feeling down, depressed, irritable, or hopeless Not at all   Total Score PHQ 2 0       Alcohol Risk Screen    Do you average more than 1 drink per night or more than 7 drinks a week: Yes    In the past three months have you have had more than 4 drinks containing alcohol on one occasion: No        Functional Ability and Level of Safety    Hearing: Hearing is good. Activities of Daily Living: The home contains: no safety equipment.   Patient does total self care      Ambulation: with no difficulty     Fall Risk:  Fall Risk Assessment, last 12 mths 6/4/2021   Able to walk? Yes   Fall in past 12 months? 0   Do you feel unsteady? 0   Are you worried about falling 0      Abuse Screen:  Patient is not abused       Cognitive Screening    Has your family/caregiver stated any concerns about your memory: no     Cognitive Screening: Normal -      Health Maintenance Due     Health Maintenance Due   Topic Date Due    Medicare Yearly Exam  06/04/2022       Patient Care Team   Patient Care Team:  Lico Gomez MD as PCP - General (Internal Medicine)  Lico Gomez MD as PCP - [facility] Empaneled Provider    History     Patient Active Problem List   Diagnosis Code    Dry eye H04.129    Allergic rhinitis J30.9    Essential hypertension I10    Pure hypercholesterolemia E78.00    Gastroesophageal reflux disease K21.9    Advance directive discussed with patient Z71.89    Vitreous floaters of right eye H43.391    Lesion of upper eyelid H02.9    Dry eye syndrome of both lacrimal glands H04.123    Bilateral presbyopia H52.4    Age-related nuclear cataract of both eyes H25.13     Past Medical History:   Diagnosis Date    Allergic rhinitis     Cancer ([facility] [address]     prostate ca - diagnosed age 53    Dry eye     GERD (gastroesophageal reflux disease)     Hypercholesterolemia     Hypertension       Past Surgical History:   Procedure Laterality Date    HX PROSTATECTOMY  2006    HX TONSILLECTOMY       Current Outpatient Medications   Medication Sig Dispense Refill    sertraline (ZOLOFT) 50 mg tablet TAKE 1 TABLET BY MOUTH EVERY DAY 90 Tablet 3    hydroCHLOROthiazide (HYDRODIURIL) 25 mg tablet TAKE 1 TABLET BY MOUTH DAILY 90 Tab 3    zolpidem (AMBIEN) 5 mg tablet TAKE 1 TABLET BY MOUTH EVERY NIGHT AS NEEDED FOR SLEEP. MAX DAILY AMOUNT: 5 MG 30 Tab 5    losartan (COZAAR) 100 mg tablet Take 1 Tab by mouth daily.  90 Tab 3    montelukast (SINGULAIR) 10 mg tablet TAKE 1 TABLET BY MOUTH DAILY 90 Tab 3    cholecalciferol, vitamin D3, (Vitamin D3) 50 mcg (2,000 unit) tab Take 1 Tab by mouth daily.  atorvastatin (LIPITOR) 10 mg tablet TAKE 1 TABLET BY MOUTH DAILY 90 Tab 3    metoprolol succinate (TOPROL-XL) 100 mg tablet TAKE 2 TABLETS BY MOUTH DAILY 180 Tab 3    diclofenac EC (VOLTAREN) 75 mg EC tablet TAKE 1 TABLET BY MOUTH TWICE DAILY 60 Tab 5    amLODIPine (NORVASC) 10 mg tablet TAKE 1 TABLET BY MOUTH DAILY 90 Tab 3    ipratropium (ATROVENT) 42 mcg (0.06 %) nasal spray U 2 SPRAYS IEN TID      Dexilant 60 mg CpDB capsule (delayed release) TAKE 1 CAPSULE BY MOUTH EVERY 48 HOURS 15 Cap 11    olopatadine (PATANOL) 0.1 % ophthalmic solution olopatadine 0.1 % eye drops   INSTILL 1 DROP IN AFFECTED EYE(S) TWICE DAILY AS DIRECTED AT AN INTERVAL OF 6 TO 8 HOURS      levocetirizine (XYZAL) 5 mg tablet TK 1 T PO QD  1    azelastine (ASTELIN) 137 mcg (0.1 %) nasal spray U 2 SPRAYS IEN BID  3    multivitamin, tx-iron-ca-min (THERA-M W/ IRON) 9 mg iron-400 mcg tab tablet Take 1 Tab by mouth daily.  cyanocobalamin 1,000 mcg tablet Take 1,000 mcg by mouth daily.  metroNIDAZOLE (METROGEL) 1 % topical gel Apply  to affected area daily. Use a thin layer to affected areas after washing      clindamycin phosphate 1 % swab by Apply Externally route.  desoximetasone (TOPICORT) 0.25 % topical cream Apply  to affected area two (2) times a day. No Known Allergies    Family History   Problem Relation Age of Onset   Michelle Phillips Arthritis-rheumatoid Father     Cancer Father         prostate ca    Diabetes Paternal Grandfather      Social History     Tobacco Use    Smoking status: Never Smoker    Smokeless tobacco: Never Used   Substance Use Topics    Alcohol use:  Yes         Suzette Snowden MD

## 2021-06-04 NOTE — PROGRESS NOTES
Chief Complaint   Patient presents with    Blood Pressure Check     6 mo follow up     Abdominal Pain    Dry Eye           1. Have you been to the ER, urgent care clinic since your last visit? Hospitalized since your last visit? No    2. Have you seen or consulted any other health care providers outside of the [address] [place] [address] since your last visit? Include any pap smears or colon screening.  No

## 2021-06-04 NOTE — PATIENT INSTRUCTIONS
Medicare Wellness Visit, Male    The best way to live healthy is to have a lifestyle where you eat a well-balanced diet, exercise regularly, limit alcohol use, and quit all forms of tobacco/nicotine, if applicable. Regular preventive services are another way to keep healthy. Preventive services (vaccines, screening tests, monitoring & exams) can help personalize your care plan, which helps you manage your own care. Screening tests can find health problems at the earliest stages, when they are easiest to treat. [facility] follows the current, evidence-based guidelines published by the [facility] ([facility]) when recommending preventive services for our patients. Because we follow these guidelines, sometimes recommendations change over time as research supports it. (For example, a prostate screening blood test is no longer routinely recommended for men with no symptoms). Of course, you and your doctor may decide to screen more often for some diseases, based on your risk and co-morbidities (chronic disease you are already diagnosed with). Preventive services for you include:  - Medicare offers their members a free annual wellness visit, which is time for you and your primary care provider to discuss and plan for your preventive service needs. Take advantage of this benefit every year!  -All adults over age 72 should receive the recommended pneumonia vaccines. Current USPSTF guidelines recommend a series of two vaccines for the best pneumonia protection.   -All adults should have a flu vaccine yearly and tetanus vaccine every 10 years.  -All adults age 48 and older should receive the shingles vaccines (series of two vaccines).        -All adults age 38-68 who are overweight should have a diabetes screening test once every three years.   -Other screening tests & preventive services for persons with diabetes include: an eye exam to screen for diabetic retinopathy, a kidney function test, a foot exam, and stricter control over your cholesterol.   -Cardiovascular screening for adults with routine risk involves an electrocardiogram (ECG) at intervals determined by the provider.   -Colorectal cancer screening should be done for adults age 54-65 with no increased risk factors for colorectal cancer. There are a number of acceptable methods of screening for this type of cancer. Each test has its own benefits and drawbacks. Discuss with your provider what is most appropriate for you during your annual wellness visit. The different tests include: colonoscopy (considered the best screening method), a fecal occult blood test, a fecal DNA test, and sigmoidoscopy.  -All adults born between [facility] should be screened once for Hepatitis C.  -An Abdominal Aortic Aneurysm (AAA) Screening is recommended for men age 73-68 who has ever smoked in their lifetime.      Here is a list of your current Health Maintenance items (your personalized list of preventive services) with a due date:  Health Maintenance Due   Topic Date Due    Annual Well Visit  06/04/2022

## 2021-06-23 ENCOUNTER — TELEPHONE (OUTPATIENT)
Dept: INTERNAL MEDICINE CLINIC | Age: 71
End: 2021-06-23

## 2021-06-23 RX ORDER — FLUOCINONIDE TOPICAL SOLUTION USP, 0.05% 0.5 MG/ML
SOLUTION TOPICAL
Qty: 60 ML | Refills: 1 | Status: SHIPPED | OUTPATIENT
Start: 2021-06-23 | End: 2022-02-17 | Stop reason: SDUPTHER

## 2021-06-23 NOTE — TELEPHONE ENCOUNTER
----- Message from Lexie Bhandari sent at 6/23/2021 12:16 PM EDT -----  Regarding: FW: Prescription Question  Contact: [phone]    ----- Message -----  From: Chris Landin  Sent: 6/23/2021  10:34 AM EDT  To: Elen Benavides  Subject: Prescription Question                            Please send refills for fluocinonide to [facility]. I use it when necessary for scalp itch. Thanks.

## 2021-06-23 NOTE — TELEPHONE ENCOUNTER
----- Message from Emma Machado sent at 6/23/2021  4:24 PM EDT -----  Regarding: FW: Prescription Question  Contact: [phone]    ----- Message -----  From: Sima Cartagena  Sent: 6/23/2021   3:50 PM EDT  To: [facility]ie [facility]  Subject: RE: Prescription Question                        Yes

## 2021-06-27 DIAGNOSIS — G47.00 INSOMNIA, UNSPECIFIED TYPE: ICD-10-CM

## 2021-06-28 RX ORDER — ZOLPIDEM TARTRATE 5 MG/1
5 TABLET ORAL
Qty: 30 TABLET | Refills: 5 | Status: SHIPPED | OUTPATIENT
Start: 2021-06-28 | End: 2021-10-27 | Stop reason: SDUPTHER

## 2021-07-22 ENCOUNTER — PATIENT MESSAGE (OUTPATIENT)
Dept: INTERNAL MEDICINE CLINIC | Age: 71
End: 2021-07-22

## 2021-07-29 NOTE — TELEPHONE ENCOUNTER
Trying to drink less at night. In evening drinkiing 7-8 oz of vodka. Long term habit. May have 1- beer or glass of wine during the day. Hasn't really tried to cut back in the past.    Wonders about ZEKE on line. Has looing into prgoram and they use naltrexone. Encouraged him to try ZEKE. Encouraged changing behaviors after dinner.   Going for a drive, walk, etc.

## 2021-07-29 NOTE — TELEPHONE ENCOUNTER
----- Message from Ruthann Kirkpatrick LPN sent at 9/86/2334  8:51 AM EDT -----  Regarding: FW: Non-Urgent Medical Question  Contact: [phone]    ----- Message -----  From: Mayela Maza  Sent: 7/27/2021   8:14 AM EDT  To: Jacinda Galeano [place]  Subject: RE: Non-Urgent Medical Question                  Dr Sheilda Gottron:  I would like to speak with you about possible medication to reduce alcohol intake.   Thanks,  [employer]

## 2021-08-01 RX ORDER — DICLOFENAC SODIUM 75 MG/1
TABLET, DELAYED RELEASE ORAL
Qty: 60 TABLET | Refills: 5 | Status: SHIPPED | OUTPATIENT
Start: 2021-08-01 | End: 2022-09-07

## 2021-08-03 ENCOUNTER — OFFICE VISIT (OUTPATIENT)
Dept: INTERNAL MEDICINE CLINIC | Age: 71
End: 2021-08-03
Payer: MEDICARE

## 2021-08-03 VITALS
HEIGHT: 73 IN | WEIGHT: 224 LBS | DIASTOLIC BLOOD PRESSURE: 62 MMHG | OXYGEN SATURATION: 95 % | BODY MASS INDEX: 29.69 KG/M2 | TEMPERATURE: 98.2 F | RESPIRATION RATE: 20 BRPM | HEART RATE: 53 BPM | SYSTOLIC BLOOD PRESSURE: 110 MMHG

## 2021-08-03 DIAGNOSIS — R20.0 NUMBNESS IN FEET: Primary | ICD-10-CM

## 2021-08-03 DIAGNOSIS — R20.0 NUMBNESS OF FINGERS: ICD-10-CM

## 2021-08-03 PROCEDURE — G8536 NO DOC ELDER MAL SCRN: HCPCS | Performed by: PHYSICIAN ASSISTANT

## 2021-08-03 PROCEDURE — G0463 HOSPITAL OUTPT CLINIC VISIT: HCPCS | Performed by: PHYSICIAN ASSISTANT

## 2021-08-03 PROCEDURE — 99213 OFFICE O/P EST LOW 20 MIN: CPT | Performed by: PHYSICIAN ASSISTANT

## 2021-08-03 PROCEDURE — G8752 SYS BP LESS 140: HCPCS | Performed by: PHYSICIAN ASSISTANT

## 2021-08-03 PROCEDURE — 3017F COLORECTAL CA SCREEN DOC REV: CPT | Performed by: PHYSICIAN ASSISTANT

## 2021-08-03 PROCEDURE — G8754 DIAS BP LESS 90: HCPCS | Performed by: PHYSICIAN ASSISTANT

## 2021-08-03 PROCEDURE — G8427 DOCREV CUR MEDS BY ELIG CLIN: HCPCS | Performed by: PHYSICIAN ASSISTANT

## 2021-08-03 PROCEDURE — G8432 DEP SCR NOT DOC, RNG: HCPCS | Performed by: PHYSICIAN ASSISTANT

## 2021-08-03 PROCEDURE — 1101F PT FALLS ASSESS-DOCD LE1/YR: CPT | Performed by: PHYSICIAN ASSISTANT

## 2021-08-03 PROCEDURE — G8417 CALC BMI ABV UP PARAM F/U: HCPCS | Performed by: PHYSICIAN ASSISTANT

## 2021-08-03 NOTE — PROGRESS NOTES
1. Have you been to the ER, urgent care clinic since your last visit? Hospitalized since your last visit? No    2. Have you seen or consulted any other health care providers outside of the [address] [place] [address] since your last visit? Include any pap smears or colon screening. No    There are no preventive care reminders to display for this patient. Patient c/o numbness in bilateral finger and toes, with left shoulder spasms for x2 weeks. Patient feels his balance is off and moves a little slower.

## 2021-08-03 NOTE — PROGRESS NOTES
Chief Complaint   Patient presents with    Numbness     he is a 69y.o. year old male who presents for evaluation. Patient presents to the office for evaluation of numbness into the fingers and toes. He states it has been going on now for several weeks. He states he has not been having pain. He reports he was told in his late 29's that he had a disc issue. He reports they wanted to do surgery but after a second opinion it was advised he did not need it. He states that he also has some pain of the left shoulder and right shoulder but the left is worse. He has been told in the past by a massage therapist that he develops trigger points and was advised to use a tennis ball to alleviate the pain. The patient reports that the numbness has not stopped him from doing his everyday activities. He reports that he plays golf twice a week and he even cleaned out his laundry event yesterday. The patient reports that he had recently had some labs with Dr. Janice Delvalle. Reviewed PmHx, RxHx, FmHx, SocHx, AllgHx and updated and dated in the chart. Review of Systems - negative except as listed above    Objective:     Vitals:    08/03/21 1315   BP: 110/62   Pulse: (!) 53   Resp: 20   Temp: 98.2 °F (36.8 °C)   TempSrc: Temporal   SpO2: 95%   Weight: 224 lb (101.6 kg)   Height: 6' 1\" (1.854 m)     Physical Examination: General appearance - alert, well appearing, and in no distress  Mental status - normal mood, behavior, speech, dress, motor activity, and thought processes  Neck - supple, no significant adenopathy  Back exam - full range of motion, no tenderness, palpable spasm or pain on motion, negative straight-leg raise bilaterally   Neurological - normal monofilament exam  Musculoskeletal - cervical- wnl ROM mild tenderness both left and right upper trapezius muscle. Patient reports tenderness deep in the joint of shoulder joint.  ROM is normal without increase of pain    Lumbar-ROM wnl without increase of pain    Assessment/ Plan:   Diagnoses and all orders for this visit:    1. Numbness in feet  -     TSH 3RD GENERATION; Future  -     VITAMIN B12; Future  -     BRIELLE BY MULTIPLEX FLOW IA, QL; Future  -     SED RATE (ESR); Future  -     XR SPINE CERV 4 OR 5 V; Future  -     XR SPINE LUMB 2 OR 3 V; Future    2. Numbness of fingers  -     TSH 3RD GENERATION; Future  -     VITAMIN B12; Future  -     BRIELLE BY MULTIPLEX FLOW IA, QL; Future  -     SED RATE (ESR); Future  -     XR SPINE CERV 4 OR 5 V; Future  -     XR SPINE LUMB 2 OR 3 V; Future    I explained to the patient other than some tenderness of the upper trapezius muscle nothing really stood out on his physical exam today. I would like the patient to get some additional labs done today and an x-ray of both his cervical spine and his lumbar spine. Once these results are and I will discuss this with Dr. Steve Monroe and come up our treatment plan. All this was explained to the patient and he understands and agrees. I have discussed the diagnosis with the patient and the intended plan as seen in the above orders. The patient has received an after-visit summary and questions were answered concerning future plans.      Medication Side Effects and Warnings were discussed with patient: no  Patient Labs were reviewed and or requested: yes  Patient Past Records were reviewed and or requested  yes    Sherron Luque PA-C

## 2021-08-04 ENCOUNTER — HOSPITAL ENCOUNTER (OUTPATIENT)
Dept: GENERAL RADIOLOGY | Age: 71
Discharge: HOME | End: 2021-08-04
Payer: MEDICARE

## 2021-08-04 DIAGNOSIS — R20.0 NUMBNESS OF FINGERS: ICD-10-CM

## 2021-08-04 DIAGNOSIS — R20.0 NUMBNESS IN FEET: ICD-10-CM

## 2021-08-04 PROCEDURE — 72050 X-RAY EXAM NECK SPINE 4/5VWS: CPT

## 2021-08-04 PROCEDURE — 72100 X-RAY EXAM L-S SPINE 2/3 VWS: CPT

## 2021-08-04 NOTE — PROGRESS NOTES
Patient is aware and verbalized understanding.  Requesting to know if you received the xray result of neck from 1999

## 2021-08-04 NOTE — PROGRESS NOTES
Please contact the patient and let him know he has a considerable amount of changes noted in neck. His lumbar has some mild to moderate changes. I am still waiting on his labs. Let him know I will be sharing this with Dr. Dionicia Galeazzi tomorrow and he will be informed with further instructions.  Thank you

## 2021-08-04 NOTE — PROGRESS NOTES
Please contact the patient and let him know he has a considerable amount of changes noted in neck. His lumbar has some mild to moderate changes. I am still waiting on his labs. Let him know I will be sharing this with Dr. Moshe Felix tomorrow and he will be informed with further instructions.  Thank you

## 2021-08-05 ENCOUNTER — TELEPHONE (OUTPATIENT)
Dept: INTERNAL MEDICINE CLINIC | Age: 71
End: 2021-08-05

## 2021-08-05 ENCOUNTER — DOCUMENTATION ONLY (OUTPATIENT)
Dept: INTERNAL MEDICINE CLINIC | Age: 71
End: 2021-08-05

## 2021-08-05 DIAGNOSIS — M47.26 OSTEOARTHRITIS OF SPINE WITH RADICULOPATHY, LUMBAR REGION: ICD-10-CM

## 2021-08-05 DIAGNOSIS — M47.892 OTHER OSTEOARTHRITIS OF SPINE, CERVICAL REGION: ICD-10-CM

## 2021-08-05 DIAGNOSIS — R20.0 NUMBNESS OF FINGERS: ICD-10-CM

## 2021-08-05 DIAGNOSIS — R20.0 NUMBNESS IN FEET: Primary | ICD-10-CM

## 2021-08-05 DIAGNOSIS — M47.22 OSTEOARTHRITIS OF SPINE WITH RADICULOPATHY, CERVICAL REGION: Primary | ICD-10-CM

## 2021-08-05 RX ORDER — METHYLPREDNISOLONE 4 MG/1
TABLET ORAL
Qty: 1 DOSE PACK | Refills: 0 | Status: SHIPPED | OUTPATIENT
Start: 2021-08-05 | End: 2021-12-06 | Stop reason: ALTCHOICE

## 2021-09-01 ENCOUNTER — TELEPHONE (OUTPATIENT)
Dept: INTERNAL MEDICINE CLINIC | Age: 71
End: 2021-09-01

## 2021-09-01 RX ORDER — AZELASTINE 1 MG/ML
2 SPRAY, METERED NASAL 2 TIMES DAILY
Qty: 3 EACH | Refills: 3 | Status: SHIPPED | OUTPATIENT
Start: 2021-09-01 | End: 2022-10-05 | Stop reason: SDUPTHER

## 2021-09-01 RX ORDER — IPRATROPIUM BROMIDE 42 UG/1
2 SPRAY, METERED NASAL 3 TIMES DAILY
Qty: 45 ML | Refills: 3 | Status: SHIPPED | OUTPATIENT
Start: 2021-09-01 | End: 2022-10-05 | Stop reason: SDUPTHER

## 2021-09-01 RX ORDER — MONTELUKAST SODIUM 10 MG/1
10 TABLET ORAL DAILY
Qty: 90 TABLET | Refills: 3 | Status: SHIPPED | OUTPATIENT
Start: 2021-09-01 | End: 2022-06-21 | Stop reason: ALTCHOICE

## 2021-09-01 NOTE — TELEPHONE ENCOUNTER
----- Message from Ariella Bradford sent at 8/31/2021  4:23 PM EDT -----  Regarding: FW: Non-Urgent Medical Question  Contact: [phone]    ----- Message -----  From: Mayela Maza  Sent: 8/31/2021   3:52 PM EDT  To: Jacinda Benavides  Subject: Non-Urgent Medical Question                      Dr Sheilda Gottron:  Can you refill the following prescriptions for me? My ENT's office in [place] [address] will not do since I have not seen him since January. Ipratropium 0.06%, Azelastine 0.1% and Montelukast 10 mg.  I use [facility] at [place]. Caitlyn Bae [phone].   Thanks,  Sandor Mo

## 2021-10-24 RX ORDER — METOPROLOL SUCCINATE 100 MG/1
TABLET, EXTENDED RELEASE ORAL
Qty: 180 TABLET | Refills: 3 | Status: SHIPPED | OUTPATIENT
Start: 2021-10-24 | End: 2021-12-30 | Stop reason: SDUPTHER

## 2021-10-24 RX ORDER — METOPROLOL SUCCINATE 100 MG/1
TABLET, EXTENDED RELEASE ORAL
Qty: 180 TABLET | Refills: 3 | Status: SHIPPED | OUTPATIENT
Start: 2021-10-24 | End: 2021-12-06 | Stop reason: SDUPTHER

## 2021-10-27 DIAGNOSIS — G47.00 INSOMNIA, UNSPECIFIED TYPE: ICD-10-CM

## 2021-10-27 RX ORDER — ATORVASTATIN CALCIUM 10 MG/1
10 TABLET, FILM COATED ORAL DAILY
Qty: 90 TABLET | Refills: 3 | Status: SHIPPED | OUTPATIENT
Start: 2021-10-27 | End: 2022-06-21 | Stop reason: ALTCHOICE

## 2021-10-27 RX ORDER — ZOLPIDEM TARTRATE 5 MG/1
5 TABLET ORAL
Qty: 30 TABLET | Refills: 5 | Status: SHIPPED | OUTPATIENT
Start: 2021-10-27 | End: 2021-12-30 | Stop reason: SDUPTHER

## 2021-12-06 ENCOUNTER — OFFICE VISIT (OUTPATIENT)
Dept: INTERNAL MEDICINE CLINIC | Age: 71
End: 2021-12-06
Payer: MEDICARE

## 2021-12-06 VITALS
DIASTOLIC BLOOD PRESSURE: 67 MMHG | RESPIRATION RATE: 14 BRPM | BODY MASS INDEX: 29.45 KG/M2 | WEIGHT: 222.2 LBS | HEART RATE: 50 BPM | HEIGHT: 73 IN | TEMPERATURE: 98 F | OXYGEN SATURATION: 98 % | SYSTOLIC BLOOD PRESSURE: 117 MMHG

## 2021-12-06 DIAGNOSIS — E78.00 PURE HYPERCHOLESTEROLEMIA: ICD-10-CM

## 2021-12-06 DIAGNOSIS — R73.9 ELEVATED BLOOD SUGAR: ICD-10-CM

## 2021-12-06 DIAGNOSIS — I10 ESSENTIAL HYPERTENSION: Primary | ICD-10-CM

## 2021-12-06 PROCEDURE — 99214 OFFICE O/P EST MOD 30 MIN: CPT | Performed by: INTERNAL MEDICINE

## 2021-12-06 PROCEDURE — G8752 SYS BP LESS 140: HCPCS | Performed by: INTERNAL MEDICINE

## 2021-12-06 PROCEDURE — G0463 HOSPITAL OUTPT CLINIC VISIT: HCPCS | Performed by: INTERNAL MEDICINE

## 2021-12-06 PROCEDURE — G8419 CALC BMI OUT NRM PARAM NOF/U: HCPCS | Performed by: INTERNAL MEDICINE

## 2021-12-06 PROCEDURE — G8427 DOCREV CUR MEDS BY ELIG CLIN: HCPCS | Performed by: INTERNAL MEDICINE

## 2021-12-06 PROCEDURE — G8510 SCR DEP NEG, NO PLAN REQD: HCPCS | Performed by: INTERNAL MEDICINE

## 2021-12-06 PROCEDURE — G8754 DIAS BP LESS 90: HCPCS | Performed by: INTERNAL MEDICINE

## 2021-12-06 PROCEDURE — 3017F COLORECTAL CA SCREEN DOC REV: CPT | Performed by: INTERNAL MEDICINE

## 2021-12-06 PROCEDURE — G8536 NO DOC ELDER MAL SCRN: HCPCS | Performed by: INTERNAL MEDICINE

## 2021-12-06 PROCEDURE — 1101F PT FALLS ASSESS-DOCD LE1/YR: CPT | Performed by: INTERNAL MEDICINE

## 2021-12-06 NOTE — PROGRESS NOTES
Subjective:     Isaías Dang is a 66 y.o. male who presents for follow up of hypertension, hyperlipidemia and elevated blood sugar. Diet and Lifestyle: generally follows a low sodium diet, exercises regularly  Home BP Monitoring: is not measured at home    Cardiovascular ROS: taking medications as instructed, no medication side effects noted, no TIA's, no chest pain on exertion, no dyspnea on exertion, no swelling of ankles, no orthostatic dizziness or lightheadedness, no palpitations. New concerns:   See in September for Cervical arthritis/DDD with bilat hand and feet numbness. Went for PT. Numbness is slightly better. Harder to fasten buttons secondary to stiffness and numbness. Using inflatable traction at home. Leaving to go back to [place] 12/24    Current Outpatient Medications   Medication Sig Dispense Refill    zolpidem (AMBIEN) 5 mg tablet Take 1 Tablet by mouth nightly as needed for Sleep. Max Daily Amount: 5 mg. 30 Tablet 5    atorvastatin (LIPITOR) 10 mg tablet Take 1 Tablet by mouth daily. 90 Tablet 3    metoprolol succinate (TOPROL-XL) 100 mg tablet TAKE 2 TABLETS BY MOUTH DAILY 180 Tablet 3    montelukast (SINGULAIR) 10 mg tablet Take 1 Tablet by mouth daily. 90 Tablet 3    ipratropium (ATROVENT) 42 mcg (0.06 %) nasal spray 2 Sprays by Both Nostrils route three (3) times daily. 45 mL 3    azelastine (ASTELIN) 137 mcg (0.1 %) nasal spray 2 Sprays by Both Nostrils route two (2) times a day. Use in each nostril as directed 3 Each 3    diclofenac EC (VOLTAREN) 75 mg EC tablet TAKE 1 TABLET BY MOUTH TWICE DAILY 60 Tablet 5    Dexilant 60 mg CpDB capsule (delayed release) TAKE 1 CAPSULE BY MOUTH EVERY 48 HOURS 45 Capsule 3    fluocinoNIDE (LIDEX) 0.05 % external solution Apply to affected area (scalp) every day-bid.  60 mL 1    sertraline (ZOLOFT) 50 mg tablet TAKE 1 TABLET BY MOUTH EVERY DAY 90 Tablet 3    hydroCHLOROthiazide (HYDRODIURIL) 25 mg tablet TAKE 1 TABLET BY MOUTH DAILY 90 Tab 3    losartan (COZAAR) 100 mg tablet Take 1 Tab by mouth daily. 90 Tab 3    cholecalciferol, vitamin D3, (Vitamin D3) 50 mcg (2,000 unit) tab Take 1 Tab by mouth daily.  amLODIPine (NORVASC) 10 mg tablet TAKE 1 TABLET BY MOUTH DAILY 90 Tab 3    olopatadine (PATANOL) 0.1 % ophthalmic solution olopatadine 0.1 % eye drops   INSTILL 1 DROP IN AFFECTED EYE(S) TWICE DAILY AS DIRECTED AT AN INTERVAL OF 6 TO 8 HOURS      levocetirizine (XYZAL) 5 mg tablet TK 1 T PO QD  1    multivitamin, tx-iron-ca-min (THERA-M W/ IRON) 9 mg iron-400 mcg tab tablet Take 1 Tab by mouth daily.  cyanocobalamin 1,000 mcg tablet Take 1,000 mcg by mouth daily.  metroNIDAZOLE (METROGEL) 1 % topical gel Apply  to affected area daily. Use a thin layer to affected areas after washing      clindamycin phosphate 1 % swab by Apply Externally route.  desoximetasone (TOPICORT) 0.25 % topical cream Apply  to affected area two (2) times a day. Lab Results   Component Value Date/Time    Hemoglobin A1c 5.8 (H) 06/04/2021 10:48 AM    Hemoglobin A1c 5.4 06/23/2020 10:50 AM    Hemoglobin A1c 5.6 05/30/2019 11:08 AM    Glucose 106 (H) 06/04/2021 10:48 AM    LDL, calculated 107.8 (H) 06/04/2021 10:48 AM    Creatinine 1.00 06/04/2021 10:48 AM      Lab Results   Component Value Date/Time    Cholesterol, total 202 (H) 06/04/2021 10:48 AM    HDL Cholesterol 78 06/04/2021 10:48 AM    LDL, calculated 107.8 (H) 06/04/2021 10:48 AM    Triglyceride 81 06/04/2021 10:48 AM    CHOL/HDL Ratio 2.6 06/04/2021 10:48 AM     Lab Results   Component Value Date/Time    ALT (SGPT) 30 06/04/2021 10:48 AM    Alk.  phosphatase 73 06/04/2021 10:48 AM    Bilirubin, direct 0.17 05/15/2018 09:02 AM    Bilirubin, total 0.6 06/04/2021 10:48 AM    Albumin 4.1 06/04/2021 10:48 AM    Protein, total 6.9 06/04/2021 10:48 AM     Lab Results   Component Value Date/Time    GFR est non-AA >60 06/04/2021 10:48 AM    GFR est AA >60 06/04/2021 10:48 AM Creatinine 1.00 06/04/2021 10:48 AM    BUN 26 (H) 06/04/2021 10:48 AM    Sodium 138 06/04/2021 10:48 AM    Potassium 4.2 06/04/2021 10:48 AM    Chloride 103 06/04/2021 10:48 AM    CO2 32 06/04/2021 10:48 AM        Review of Systems, additional:  Pertinent items are noted in HPI. Objective:     Visit Vitals  /67 (BP 1 Location: Left upper arm, BP Patient Position: Sitting, BP Cuff Size: Adult)   Pulse (!) 50   Temp 98 °F (36.7 °C) (Temporal)   Resp 14   Ht 6' 1\" (1.854 m)   Wt 222 lb 3.2 oz (100.8 kg)   SpO2 98%   BMI 29.32 kg/m²     Appearance: alert, well appearing, and in no distress and oriented to person, place, and time. General exam:   . NECK: supple, no lad, no bruit, no tm  LUNGS: cta bilat  CV rrr, no m/g/r  ABD: soft, nt, nd, nabs  EXT: no c/c/e    Assessment/Plan:     hypertension well controlled. current treatment plan is effective, no change in therapy. HLD _ - controlled  Continue same meds  Check labs     Elevated BS - mild elevation 6 months ago  trying to limit sugars and cars  Continue exercise  Repeat labs. Numbness and tingling -  Lab work failed to reveal cause except for mild elevation BS  ? From cervical arthritis.     Continue PT    Orders Placed This Encounter    METABOLIC PANEL, COMPREHENSIVE    LIPID PANEL    HEMOGLOBIN A1C WITH EAG     Follow-up and Dispositions    · Return in about 6 months (around 6/6/2022) for  PE.

## 2021-12-06 NOTE — PROGRESS NOTES
Reviewed record in preparation for visit and have obtained necessary documentation. Identified pt with two pt identifiers(name and ). Chief Complaint   Patient presents with    Follow-up     Blood pressure 117/67, pulse (!) 50, temperature 98 °F (36.7 °C), temperature source Temporal, resp. rate 14, height 6' 1\" (1.854 m), weight 222 lb 3.2 oz (100.8 kg), SpO2 98 %. Health Maintenance Due   Topic Date Due    Yearly Flu Vaccine (1) 2021    COVID-19 Vaccine (3 - Booster for Pfizer series) 2021       Mr. Carmen Puri has a reminder for a \"due or due soon\" health maintenance. I have asked that he discuss this further with his primary care provider for follow-up on this health maintenance. Coordination of Care Questionnaire:  :     1) Have you been to an emergency room, urgent care clinic since your last visit? No  Hospitalized since your last visit? no             2) Have you seen or consulted any other health care providers outside of [address] [place] [address] since your last visit? yes  (Include any pap smears or colon screenings in this section.)    3) In the event something were to happen to you and you were unable to speak on your behalf, do you have an Advance Directive/ Living Will in place stating your wishes?  YES

## 2021-12-20 RX ORDER — AMLODIPINE BESYLATE 10 MG/1
TABLET ORAL
Qty: 90 TABLET | Refills: 3 | Status: SHIPPED | OUTPATIENT
Start: 2021-12-20 | End: 2022-08-22 | Stop reason: DRUGHIGH

## 2021-12-30 DIAGNOSIS — G47.00 INSOMNIA, UNSPECIFIED TYPE: ICD-10-CM

## 2022-01-04 RX ORDER — METOPROLOL SUCCINATE 100 MG/1
200 TABLET, EXTENDED RELEASE ORAL DAILY
Qty: 180 TABLET | Refills: 3 | Status: SHIPPED | OUTPATIENT
Start: 2022-01-04

## 2022-01-04 RX ORDER — LOSARTAN POTASSIUM 100 MG/1
100 TABLET ORAL DAILY
Qty: 90 TABLET | Refills: 3 | Status: SHIPPED | OUTPATIENT
Start: 2022-01-04 | End: 2022-02-17 | Stop reason: SDUPTHER

## 2022-01-04 RX ORDER — ZOLPIDEM TARTRATE 5 MG/1
5 TABLET ORAL
Qty: 30 TABLET | Refills: 5 | Status: SHIPPED | OUTPATIENT
Start: 2022-01-04 | End: 2022-07-08 | Stop reason: SDUPTHER

## 2022-02-17 RX ORDER — LOSARTAN POTASSIUM 100 MG/1
100 TABLET ORAL DAILY
Qty: 90 TABLET | Refills: 3 | Status: SHIPPED | OUTPATIENT
Start: 2022-02-17 | End: 2022-10-05

## 2022-02-17 RX ORDER — FLUOCINONIDE TOPICAL SOLUTION USP, 0.05% 0.5 MG/ML
SOLUTION TOPICAL
Qty: 60 ML | Refills: 1 | Status: SHIPPED | OUTPATIENT
Start: 2022-02-17

## 2022-02-17 RX ORDER — HYDROCHLOROTHIAZIDE 25 MG/1
25 TABLET ORAL DAILY
Qty: 90 TABLET | Refills: 3 | Status: SHIPPED | OUTPATIENT
Start: 2022-02-17 | End: 2022-05-04

## 2022-02-17 NOTE — TELEPHONE ENCOUNTER
Future Appointments:  Future Appointments   Date Time Provider Derrell Brown   6/8/2022  8:20 AM Judyth Romberg, MD [facility]        Last Appointment With Me:  12/6/2021     Requested Prescriptions     Pending Prescriptions Disp Refills    hydroCHLOROthiazide (HYDRODIURIL) 25 mg tablet 90 Tablet 3     Sig: Take 1 Tablet by mouth daily.  fluocinoNIDE (LIDEX) 0.05 % external solution 60 mL 1     Sig: Apply to affected area (scalp) every day-bid.  losartan (COZAAR) 100 mg tablet 90 Tablet 3     Sig: Take 1 Tablet by mouth daily.

## 2022-02-21 RX ORDER — FLUTICASONE PROPIONATE 0.05 MG/G
OINTMENT TOPICAL 2 TIMES DAILY
Qty: 60 G | Refills: 0 | Status: SHIPPED | OUTPATIENT
Start: 2022-02-21 | End: 2022-08-19 | Stop reason: SDUPTHER

## 2022-03-18 PROBLEM — E78.00 PURE HYPERCHOLESTEROLEMIA: Status: ACTIVE | Noted: 2017-10-16

## 2022-03-19 PROBLEM — H52.4 BILATERAL PRESBYOPIA: Status: ACTIVE | Noted: 2017-12-18

## 2022-03-19 PROBLEM — H02.9 LESION OF UPPER EYELID: Status: ACTIVE | Noted: 2017-12-18

## 2022-03-19 PROBLEM — Z71.89 ADVANCE DIRECTIVE DISCUSSED WITH PATIENT: Status: ACTIVE | Noted: 2018-05-11

## 2022-03-19 PROBLEM — H43.391 VITREOUS FLOATERS OF RIGHT EYE: Status: ACTIVE | Noted: 2017-12-18

## 2022-03-19 PROBLEM — I10 ESSENTIAL HYPERTENSION: Status: ACTIVE | Noted: 2017-10-16

## 2022-03-19 PROBLEM — K21.9 GASTROESOPHAGEAL REFLUX DISEASE: Status: ACTIVE | Noted: 2017-12-18

## 2022-03-20 PROBLEM — H04.123 DRY EYE SYNDROME OF BOTH LACRIMAL GLANDS: Status: ACTIVE | Noted: 2017-12-18

## 2022-03-20 PROBLEM — H25.13 AGE-RELATED NUCLEAR CATARACT OF BOTH EYES: Status: ACTIVE | Noted: 2017-12-18

## 2022-04-18 ENCOUNTER — TELEPHONE (OUTPATIENT)
Dept: INTERNAL MEDICINE CLINIC | Age: 72
End: 2022-04-18

## 2022-04-18 DIAGNOSIS — R19.7 DIARRHEA, UNSPECIFIED TYPE: Primary | ICD-10-CM

## 2022-04-18 RX ORDER — DIPHENOXYLATE HYDROCHLORIDE AND ATROPINE SULFATE 2.5; .025 MG/1; MG/1
1 TABLET ORAL
Qty: 30 TABLET | Refills: 0 | Status: SHIPPED | OUTPATIENT
Start: 2022-04-18

## 2022-04-18 NOTE — TELEPHONE ENCOUNTER
Ongoing diarrhea intermittently up to 4-5 times a day and occ watery. Generally goes multiple times a day with a soft stool - in am.  No underlying constipation. Diarrhea occurs a couple of times a week. No associated cramping or n/v. No melena or dark black stools. This is worse over the last several days, barely made it home after eating out on Saturday. ? Food intolerance. 2 week trial without dairy. Lomotil prn. Coughing a little more. On a lot of allergy meds. Most likely allergies and not medications. Tingling in fingers and toes, ? Medication side effect. . Stop Atorvastatin. Has f/u with me in 2 months.

## 2022-05-04 RX ORDER — HYDROCHLOROTHIAZIDE 25 MG/1
25 TABLET ORAL DAILY
Qty: 90 TABLET | Refills: 3 | Status: SHIPPED | OUTPATIENT
Start: 2022-05-04

## 2022-05-04 RX ORDER — SERTRALINE HYDROCHLORIDE 50 MG/1
TABLET, FILM COATED ORAL
Qty: 90 TABLET | Refills: 3 | Status: SHIPPED | OUTPATIENT
Start: 2022-05-04

## 2022-06-19 RX ORDER — DEXLANSOPRAZOLE 60 MG/1
CAPSULE, DELAYED RELEASE ORAL
Qty: 45 CAPSULE | Refills: 3 | Status: SHIPPED | OUTPATIENT
Start: 2022-06-19

## 2022-06-21 ENCOUNTER — OFFICE VISIT (OUTPATIENT)
Dept: INTERNAL MEDICINE CLINIC | Age: 72
End: 2022-06-21
Payer: MEDICARE

## 2022-06-21 VITALS
OXYGEN SATURATION: 97 % | DIASTOLIC BLOOD PRESSURE: 74 MMHG | BODY MASS INDEX: 29.82 KG/M2 | SYSTOLIC BLOOD PRESSURE: 127 MMHG | HEIGHT: 73 IN | TEMPERATURE: 98.3 F | RESPIRATION RATE: 18 BRPM | HEART RATE: 58 BPM | WEIGHT: 225 LBS

## 2022-06-21 DIAGNOSIS — K21.9 GASTROESOPHAGEAL REFLUX DISEASE, UNSPECIFIED WHETHER ESOPHAGITIS PRESENT: ICD-10-CM

## 2022-06-21 DIAGNOSIS — R73.9 ELEVATED BLOOD SUGAR: ICD-10-CM

## 2022-06-21 DIAGNOSIS — Z00.00 ROUTINE GENERAL MEDICAL EXAMINATION AT A HEALTH CARE FACILITY: Primary | ICD-10-CM

## 2022-06-21 DIAGNOSIS — F51.01 PRIMARY INSOMNIA: ICD-10-CM

## 2022-06-21 DIAGNOSIS — I10 ESSENTIAL HYPERTENSION: ICD-10-CM

## 2022-06-21 DIAGNOSIS — Z00.00 ROUTINE GENERAL MEDICAL EXAMINATION AT A HEALTH CARE FACILITY: ICD-10-CM

## 2022-06-21 DIAGNOSIS — E78.00 PURE HYPERCHOLESTEROLEMIA: ICD-10-CM

## 2022-06-21 DIAGNOSIS — R07.9 CHEST PAIN, UNSPECIFIED TYPE: ICD-10-CM

## 2022-06-21 DIAGNOSIS — I45.2 RIGHT BBB/LEFT ANT FASC BLOCK: ICD-10-CM

## 2022-06-21 LAB
25(OH)D3 SERPL-MCNC: 49.4 NG/ML (ref 30–100)
ALBUMIN SERPL-MCNC: 4 G/DL (ref 3.5–5)
ALBUMIN/GLOB SERPL: 1.4 {RATIO} (ref 1.1–2.2)
ALP SERPL-CCNC: 72 U/L (ref 45–117)
ALT SERPL-CCNC: 23 U/L (ref 12–78)
ANION GAP SERPL CALC-SCNC: 4 MMOL/L (ref 5–15)
APPEARANCE UR: CLEAR
AST SERPL-CCNC: 16 U/L (ref 15–37)
BASOPHILS # BLD: 0.1 K/UL (ref 0–0.1)
BASOPHILS NFR BLD: 1 % (ref 0–1)
BILIRUB SERPL-MCNC: 0.8 MG/DL (ref 0.2–1)
BILIRUB UR QL: NEGATIVE
BUN SERPL-MCNC: 22 MG/DL (ref 6–20)
BUN/CREAT SERPL: 15 (ref 12–20)
CALCIUM SERPL-MCNC: 9.5 MG/DL (ref 8.5–10.1)
CHLORIDE SERPL-SCNC: 101 MMOL/L (ref 97–108)
CHOLEST SERPL-MCNC: 227 MG/DL
CO2 SERPL-SCNC: 30 MMOL/L (ref 21–32)
COLOR UR: ABNORMAL
CREAT SERPL-MCNC: 1.48 MG/DL (ref 0.7–1.3)
CRP SERPL HS-MCNC: 3.3 MG/L
DIFFERENTIAL METHOD BLD: ABNORMAL
EOSINOPHIL # BLD: 0.7 K/UL (ref 0–0.4)
EOSINOPHIL NFR BLD: 8 % (ref 0–7)
ERYTHROCYTE [DISTWIDTH] IN BLOOD BY AUTOMATED COUNT: 12.8 % (ref 11.5–14.5)
GLOBULIN SER CALC-MCNC: 2.9 G/DL (ref 2–4)
GLUCOSE SERPL-MCNC: 113 MG/DL (ref 65–100)
GLUCOSE UR STRIP.AUTO-MCNC: NEGATIVE MG/DL
HBA1C MFR BLD HPLC: 5.3 %
HCT VFR BLD AUTO: 40.7 % (ref 36.6–50.3)
HDLC SERPL-MCNC: 59 MG/DL
HDLC SERPL: 3.8 {RATIO} (ref 0–5)
HGB BLD-MCNC: 13.8 G/DL (ref 12.1–17)
HGB UR QL STRIP: NEGATIVE
IMM GRANULOCYTES # BLD AUTO: 0 K/UL (ref 0–0.04)
IMM GRANULOCYTES NFR BLD AUTO: 0 % (ref 0–0.5)
KETONES UR QL STRIP.AUTO: ABNORMAL MG/DL
LDLC SERPL CALC-MCNC: 135 MG/DL (ref 0–100)
LEUKOCYTE ESTERASE UR QL STRIP.AUTO: NEGATIVE
LYMPHOCYTES # BLD: 1.5 K/UL (ref 0.8–3.5)
LYMPHOCYTES NFR BLD: 18 % (ref 12–49)
MCH RBC QN AUTO: 34.4 PG (ref 26–34)
MCHC RBC AUTO-ENTMCNC: 33.9 G/DL (ref 30–36.5)
MCV RBC AUTO: 101.5 FL (ref 80–99)
MONOCYTES # BLD: 0.7 K/UL (ref 0–1)
MONOCYTES NFR BLD: 8 % (ref 5–13)
NEUTS SEG # BLD: 5.3 K/UL (ref 1.8–8)
NEUTS SEG NFR BLD: 65 % (ref 32–75)
NITRITE UR QL STRIP.AUTO: NEGATIVE
NRBC # BLD: 0 K/UL (ref 0–0.01)
NRBC BLD-RTO: 0 PER 100 WBC
PH UR STRIP: 5 [PH] (ref 5–8)
PLATELET # BLD AUTO: 265 K/UL (ref 150–400)
PMV BLD AUTO: 11 FL (ref 8.9–12.9)
POTASSIUM SERPL-SCNC: 4.3 MMOL/L (ref 3.5–5.1)
PROT SERPL-MCNC: 6.9 G/DL (ref 6.4–8.2)
PROT UR STRIP-MCNC: NEGATIVE MG/DL
PSA SERPL-MCNC: 0 NG/ML (ref 0.01–4)
RBC # BLD AUTO: 4.01 M/UL (ref 4.1–5.7)
SODIUM SERPL-SCNC: 135 MMOL/L (ref 136–145)
SP GR UR REFRACTOMETRY: 1.02 (ref 1–1.03)
TRIGL SERPL-MCNC: 165 MG/DL (ref ?–150)
TSH SERPL DL<=0.05 MIU/L-ACNC: 0.89 UIU/ML (ref 0.36–3.74)
UROBILINOGEN UR QL STRIP.AUTO: 0.2 EU/DL (ref 0.2–1)
VLDLC SERPL CALC-MCNC: 33 MG/DL
WBC # BLD AUTO: 8.1 K/UL (ref 4.1–11.1)

## 2022-06-21 PROCEDURE — 83036 HEMOGLOBIN GLYCOSYLATED A1C: CPT | Performed by: INTERNAL MEDICINE

## 2022-06-21 NOTE — PROGRESS NOTES
Ramila Khan is a 66 y.o. male    Chief Complaint   Patient presents with    Complete Physical            Visit Vitals  /74   Pulse (!) 58   Temp 98.3 °F (36.8 °C) (Temporal)   Resp 18   Ht 6' 1\" (1.854 m)   Wt 225 lb (102.1 kg)   SpO2 97%   BMI 29.69 kg/m²       3 most recent PHQ Screens 6/21/2022   Little interest or pleasure in doing things Not at all   Feeling down, depressed, irritable, or hopeless Not at all   Total Score PHQ 2 0       Fall Risk Assessment, last 12 mths 6/21/2022   Able to walk? Yes   Fall in past 12 months? 0   Do you feel unsteady? -   Are you worried about falling -       Abuse Screening Questionnaire 9/28/2021   Do you ever feel afraid of your partner? N   Are you in a relationship with someone who physically or mentally threatens you? N   Is it safe for you to go home? Y       1. Have you been to the ER, urgent care clinic since your last visit? Hospitalized since your last visit? No     2. Have you seen or consulted any other health care providers outside of the [address] [place] [address] since your last visit? Include any pap smears or colon screening.  No

## 2022-06-21 NOTE — PROGRESS NOTES
Subjective:     Ramon Snyder is a 66 y.o. male presenting for  annual exam and complete physical.    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 No EKG for comparison. Had stress test at age 60 - negative but had taken his b-blocker prior. Edwin had EKGs regularly through [facility] in [facility]. No CP, tightness or sob. Slowing down a bit. Living in [place] in the winter. More involved in the community. 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 Will go to [employer] after 7/4. Cut out lactose and/or using Lactaid with improvement in diarrhea. Rare use of Lomotil. Stopped his Atorvastatin. Stopped Singulair. No worseing of allergy symptoms. Taking Dexilant for stomach only 1-2 times per week. Patient Active Problem List    Diagnosis Date Noted    Advance directive discussed with patient 05/11/2018    Gastroesophageal reflux disease 12/18/2017    Vitreous floaters of right eye 12/18/2017    Lesion of upper eyelid 12/18/2017    Dry eye syndrome of both lacrimal glands 12/18/2017    Bilateral presbyopia 12/18/2017    Age-related nuclear cataract of both eyes 12/18/2017    Essential hypertension 10/16/2017    Pure hypercholesterolemia 10/16/2017    Dry eye     Allergic rhinitis      Current Outpatient Medications   Medication Sig Dispense Refill    dexlansoprazole (DEXILANT) 60 mg CpDB capsule (delayed release) TAKE 1 CAPSULE BY MOUTH EVERY 48 HOURS 45 Capsule 3    hydroCHLOROthiazide (HYDRODIURIL) 25 mg tablet TAKE 1 TABLET BY MOUTH DAILY 90 Tablet 3    sertraline (ZOLOFT) 50 mg tablet TAKE 1 TABLET BY MOUTH EVERY DAY 90 Tablet 3    diphenoxylate-atropine (LomotiL) 2.5-0.025 mg per tablet Take 1 Tablet by mouth four (4) times daily as needed for Diarrhea. Max Daily Amount: 4 Tablets. 30 Tablet 0    fluticasone propionate (CUTIVASE) 0.005 % ointment Apply  to affected area two (2) times a day.  apply thin layer as directed 60 g 0    fluocinoNIDE (LIDEX) 0.05 % external solution Apply to affected area (scalp) every day-bid. 60 mL 1    losartan (COZAAR) 100 mg tablet Take 1 Tablet by mouth daily. 90 Tablet 3    metoprolol succinate (TOPROL-XL) 100 mg tablet Take 2 Tablets by mouth daily. 180 Tablet 3    zolpidem (AMBIEN) 5 mg tablet Take 1 Tablet by mouth nightly as needed for Sleep. Max Daily Amount: 5 mg. 30 Tablet 5    amLODIPine (NORVASC) 10 mg tablet TAKE 1 TABLET BY MOUTH DAILY 90 Tablet 3    ipratropium (ATROVENT) 42 mcg (0.06 %) nasal spray 2 Sprays by Both Nostrils route three (3) times daily. 45 mL 3    azelastine (ASTELIN) 137 mcg (0.1 %) nasal spray 2 Sprays by Both Nostrils route two (2) times a day. Use in each nostril as directed 3 Each 3    diclofenac EC (VOLTAREN) 75 mg EC tablet TAKE 1 TABLET BY MOUTH TWICE DAILY 60 Tablet 5    cholecalciferol, vitamin D3, (Vitamin D3) 50 mcg (2,000 unit) tab Take 1 Tab by mouth daily.  olopatadine (PATANOL) 0.1 % ophthalmic solution olopatadine 0.1 % eye drops   INSTILL 1 DROP IN AFFECTED EYE(S) TWICE DAILY AS DIRECTED AT AN INTERVAL OF 6 TO 8 HOURS      levocetirizine (XYZAL) 5 mg tablet TK 1 T PO QD  1    multivitamin, tx-iron-ca-min (THERA-M W/ IRON) 9 mg iron-400 mcg tab tablet Take 1 Tab by mouth daily.  cyanocobalamin 1,000 mcg tablet Take 1,000 mcg by mouth daily.  metroNIDAZOLE (METROGEL) 1 % topical gel Apply  to affected area daily. Use a thin layer to affected areas after washing      clindamycin phosphate 1 % swab by Apply Externally route.  desoximetasone (TOPICORT) 0.25 % topical cream Apply  to affected area two (2) times a day.        No Known Allergies  Past Medical History:   Diagnosis Date    Allergic rhinitis     Cancer ([address]     prostate ca - diagnosed age 53    Dry eye     GERD (gastroesophageal reflux disease)     Hypercholesterolemia     Hypertension      Past Surgical History:   Procedure Laterality Date    HX PROSTATECTOMY  2006    HX TONSILLECTOMY       Family History   Problem Relation Age of Onset   [facility] Arthritis-rheumatoid Father     Cancer Father         prostate ca    Diabetes Paternal Grandfather      Social History     Tobacco Use    Smoking status: Never Smoker    Smokeless tobacco: Never Used   Substance Use Topics    Alcohol use: Yes        No results found for: WBC, WBCT, WBCPOC, HGB, HGBPOC, HCT, HCTPOC, PLT, PLTPOC, MCV, MCVPOC, HGBEXT, HCTEXT, PLTEXT, HGBEXT, HCTEXT, PLTEXT  Lab Results   Component Value Date/Time    Hemoglobin A1c 5.4 12/06/2021 10:45 AM    Hemoglobin A1c 5.8 (H) 06/04/2021 10:48 AM    Hemoglobin A1c 5.4 06/23/2020 10:50 AM    Glucose 106 (H) 12/06/2021 10:45 AM    LDL, calculated 109 (H) 12/06/2021 10:45 AM    Creatinine 1.09 12/06/2021 10:45 AM      Lab Results   Component Value Date/Time    Cholesterol, total 200 (H) 12/06/2021 10:45 AM    HDL Cholesterol 71 12/06/2021 10:45 AM    LDL, calculated 109 (H) 12/06/2021 10:45 AM    Triglyceride 100 12/06/2021 10:45 AM    CHOL/HDL Ratio 2.8 12/06/2021 10:45 AM     Lab Results   Component Value Date/Time    ALT (SGPT) 28 12/06/2021 10:45 AM    Alk.  phosphatase 72 12/06/2021 10:45 AM    Bilirubin, direct 0.17 05/15/2018 09:02 AM    Bilirubin, total 0.6 12/06/2021 10:45 AM    Albumin 4.2 12/06/2021 10:45 AM    Protein, total 7.3 12/06/2021 10:45 AM     Lab Results   Component Value Date/Time    GFR est non-AA >60 12/06/2021 10:45 AM    GFR est AA >60 12/06/2021 10:45 AM    Creatinine 1.09 12/06/2021 10:45 AM    BUN 26 (H) 12/06/2021 10:45 AM    Sodium 139 12/06/2021 10:45 AM    Potassium 4.4 12/06/2021 10:45 AM    Chloride 103 12/06/2021 10:45 AM    CO2 30 12/06/2021 10:45 AM     No results found for: PSA, Shayna Schmidt, [id number], [id number]  Lab Results   Component Value Date/Time    TSH 0.83 08/04/2021 11:50 AM         Review of Systems  Constitutional: negative  Eyes: negative, utd with eye exam.    Ears, nose, mouth, throat, and face: negative except for intermittent rhinorrhea  Respiratory: negative  Cardiovascular: negative  Gastrointestinal: negative except for improvement in diarrhea  Genitourinary:negative  Integument/breast: negative  Hematologic/lymphatic: negative  Musculoskeletal:negative  Neurological: negative  Behavioral/Psych: negative except for feels Zoloft is helping. Endocrine: negative  Allergic/Immunologic: negative except for year round allergies  Seeing Dr. Claritza Ornelas every 6 months and dermatologist in [place] - had wart removed.       Objective:     Visit Vitals  /74   Pulse (!) 58   Temp 98.3 °F (36.8 °C) (Temporal)   Resp 18   Ht 6' 1\" (1.854 m)   Wt 225 lb (102.1 kg)   SpO2 97%   BMI 29.69 kg/m²     Physical exam:   General appearance - alert, well appearing, and in no distress and oriented to person, place, and time  Mental status - alert, oriented to person, place, and time, normal mood, behavior, speech, dress, motor activity, and thought processes  Eyes - pupils equal and reactive, extraocular eye movements intact  Ears - bilateral TM's and external ear canals normal  Nose - normal and patent, no erythema, discharge or polyps  Mouth - mucous membranes moist, pharynx normal without lesions  Neck - supple, no significant adenopathy, carotids upstroke normal bilaterally, no bruits, thyroid exam: thyroid is normal in size without nodules or tenderness  Lymphatics - no palpable lymphadenopathy, no hepatosplenomegaly  Chest - clear to auscultation, no wheezes, rales or rhonchi, symmetric air entry  Heart - normal rate, regular rhythm, normal S1, S2, no murmurs, rubs, clicks or gallops  Abdomen - soft, nontender, nondistended, no masses or organomegaly  Back exam - full range of motion, no tenderness, palpable spasm or pain on motion  Neurological - alert, oriented, normal speech, no focal findings or movement disorder noted  Musculoskeletal - no joint tenderness, deformity or swelling  Extremities - peripheral pulses normal, no pedal edema, no clubbing or cyanosis  Skin - normal coloration and turgor, no rashes, no suspicious skin lesions noted     Assessment/Plan:     66yo male here for  PE    call if any problems. Diagnoses and all orders for this visit:    1. Routine general medical examination at a health care facility  -     AMB POC EKG ROUTINE NO CHARGE  -     PSA SCREENING (SCREENING); Future  -     CBC WITH AUTOMATED DIFF; Future  -     CRP, HIGH SENSITIVITY; Future  -     URINALYSIS W/ RFLX MICROSCOPIC; Future  -     VITAMIN D, 25 HYDROXY; Future  -     TSH 3RD GENERATION; Future  -     LIPID PANEL; Future  -     METABOLIC PANEL, COMPREHENSIVE; Future    2. Essential hypertension - well controlled, cont same  -     NUCLEAR CARDIAC STRESS TEST; Future    3. Pure hypercholesterolemia, well controlled, cont same  -     NUCLEAR CARDIAC STRESS TEST; Future    4. Elevated blood sugar nml at 5.2%  -     AMB POC HEMOGLOBIN A1C    5. Gastroesophageal reflux disease, unspecified whether esophagitis present - trial otc omeprazole instead of more expensive Dexilant    6. Primary insomnia    7. Chest pain, unspecified type  -     NUCLEAR CARDIAC STRESS TEST; Future    8. Right BBB/left ant fasc block  -     NUCLEAR CARDIAC STRESS TEST; Future      Follow-up and Dispositions    · Return in about 6 months (around 12/21/2022).      Corrie Pinzon

## 2022-06-24 ENCOUNTER — HOSPITAL ENCOUNTER (OUTPATIENT)
Dept: NUCLEAR MEDICINE | Age: 72
Discharge: HOME | End: 2022-06-24
Attending: INTERNAL MEDICINE
Payer: MEDICARE

## 2022-06-24 ENCOUNTER — HOSPITAL ENCOUNTER (OUTPATIENT)
Dept: NON INVASIVE DIAGNOSTICS | Age: 72
Discharge: HOME | End: 2022-06-24
Attending: INTERNAL MEDICINE
Payer: MEDICARE

## 2022-06-24 VITALS
WEIGHT: 220 LBS | DIASTOLIC BLOOD PRESSURE: 84 MMHG | SYSTOLIC BLOOD PRESSURE: 158 MMHG | HEIGHT: 73 IN | BODY MASS INDEX: 29.16 KG/M2

## 2022-06-24 DIAGNOSIS — R07.9 CHEST PAIN, UNSPECIFIED TYPE: ICD-10-CM

## 2022-06-24 DIAGNOSIS — E78.00 PURE HYPERCHOLESTEROLEMIA: ICD-10-CM

## 2022-06-24 DIAGNOSIS — I45.2 RIGHT BBB/LEFT ANT FASC BLOCK: ICD-10-CM

## 2022-06-24 DIAGNOSIS — I10 ESSENTIAL HYPERTENSION: ICD-10-CM

## 2022-06-24 PROCEDURE — 74011000250 HC RX REV CODE- 250: Performed by: INTERNAL MEDICINE

## 2022-06-24 PROCEDURE — 93017 CV STRESS TEST TRACING ONLY: CPT

## 2022-06-24 PROCEDURE — 74011250636 HC RX REV CODE- 250/636: Performed by: INTERNAL MEDICINE

## 2022-06-24 PROCEDURE — 78452 HT MUSCLE IMAGE SPECT MULT: CPT

## 2022-06-24 RX ORDER — TETRAKIS(2-METHOXYISOBUTYLISOCYANIDE)COPPER(I) TETRAFLUOROBORATE 1 MG/ML
29 INJECTION, POWDER, LYOPHILIZED, FOR SOLUTION INTRAVENOUS
Status: COMPLETED | OUTPATIENT
Start: 2022-06-24 | End: 2022-06-24

## 2022-06-24 RX ORDER — SODIUM CHLORIDE 0.9 % (FLUSH) 0.9 %
20 SYRINGE (ML) INJECTION AS NEEDED
Status: DISCONTINUED | OUTPATIENT
Start: 2022-06-24 | End: 2022-06-28 | Stop reason: HOSPADM

## 2022-06-24 RX ORDER — TETRAKIS(2-METHOXYISOBUTYLISOCYANIDE)COPPER(I) TETRAFLUOROBORATE 1 MG/ML
10.1 INJECTION, POWDER, LYOPHILIZED, FOR SOLUTION INTRAVENOUS
Status: COMPLETED | OUTPATIENT
Start: 2022-06-24 | End: 2022-06-24

## 2022-06-24 RX ADMIN — SODIUM CHLORIDE, PRESERVATIVE FREE 20 ML: 5 INJECTION INTRAVENOUS at 10:59

## 2022-06-24 RX ADMIN — REGADENOSON 0.4 MG: 0.08 INJECTION, SOLUTION INTRAVENOUS at 10:58

## 2022-06-24 RX ADMIN — TETRAKIS(2-METHOXYISOBUTYLISOCYANIDE)COPPER(I) TETRAFLUOROBORATE 29 MILLICURIE: 1 INJECTION, POWDER, LYOPHILIZED, FOR SOLUTION INTRAVENOUS at 10:45

## 2022-06-24 RX ADMIN — TETRAKIS(2-METHOXYISOBUTYLISOCYANIDE)COPPER(I) TETRAFLUOROBORATE 10.1 MILLICURIE: 1 INJECTION, POWDER, LYOPHILIZED, FOR SOLUTION INTRAVENOUS at 08:05

## 2022-06-27 ENCOUNTER — TELEPHONE (OUTPATIENT)
Dept: INTERNAL MEDICINE CLINIC | Age: 72
End: 2022-06-27

## 2022-06-27 DIAGNOSIS — E78.9 BORDERLINE HIGH CHOLESTEROL: ICD-10-CM

## 2022-06-27 DIAGNOSIS — R73.9 ELEVATED BLOOD SUGAR: Primary | ICD-10-CM

## 2022-06-27 DIAGNOSIS — R79.82 ELEVATED C-REACTIVE PROTEIN (CRP): ICD-10-CM

## 2022-06-30 ENCOUNTER — TELEPHONE (OUTPATIENT)
Dept: INTERNAL MEDICINE CLINIC | Age: 72
End: 2022-06-30

## 2022-06-30 LAB
STRESS BASELINE DIAS BP: 84 MMHG
STRESS BASELINE HR: 52 BPM
STRESS BASELINE ST DEPRESSION: 0 MM
STRESS BASELINE SYS BP: 158 MMHG
STRESS ESTIMATED WORKLOAD: 1 METS
STRESS EXERCISE DUR MIN: 3 MIN
STRESS EXERCISE DUR SEC: 0 SEC
STRESS PEAK DIAS BP: 84 MMHG
STRESS PEAK SYS BP: 158 MMHG
STRESS PERCENT HR ACHIEVED: 51 %
STRESS POST PEAK HR: 75 BPM
STRESS RATE PRESSURE PRODUCT: NORMAL BPM*MMHG
STRESS STAGE 1 BP: NORMAL MMHG
STRESS STAGE 1 DURATION: 1 MIN:SEC
STRESS STAGE 1 HR: 50 BPM
STRESS STAGE 2 DURATION: 1 MIN:SEC
STRESS STAGE 2 HR: 65 BPM
STRESS STAGE 3 BP: NORMAL MMHG
STRESS STAGE 3 DURATION: 1 MIN:SEC
STRESS STAGE 3 HR: 65 BPM
STRESS STAGE RECOVERY 1 BP: NORMAL MMHG
STRESS STAGE RECOVERY 1 DURATION: 1 MIN:SEC
STRESS STAGE RECOVERY 1 HR: 58 BPM
STRESS STAGE RECOVERY 2 DURATION: 1 MIN:SEC
STRESS STAGE RECOVERY 2 HR: 58 BPM
STRESS STAGE RECOVERY 3 DURATION: 1 MIN:SEC
STRESS STAGE RECOVERY 3 HR: 58 BPM
STRESS TARGET HR: 148 BPM

## 2022-07-08 DIAGNOSIS — G47.00 INSOMNIA, UNSPECIFIED TYPE: ICD-10-CM

## 2022-07-11 RX ORDER — ZOLPIDEM TARTRATE 5 MG/1
5 TABLET ORAL
Qty: 30 TABLET | Refills: 5 | Status: SHIPPED | OUTPATIENT
Start: 2022-07-11 | End: 2022-08-12 | Stop reason: SDUPTHER

## 2022-07-11 NOTE — TELEPHONE ENCOUNTER
Future Appointments:  Future Appointments   Date Time Provider Derrell Brown   12/21/2022  9:00 AM Michelle Morejon MD [facility]        Last Appointment With Me:  6/21/2022     Requested Prescriptions     Pending Prescriptions Disp Refills    zolpidem (AMBIEN) 5 mg tablet 30 Tablet 5     Sig: Take 1 Tablet by mouth nightly as needed for Sleep. Max Daily Amount: 5 mg.

## 2022-08-12 ENCOUNTER — TELEPHONE (OUTPATIENT)
Dept: INTERNAL MEDICINE CLINIC | Age: 72
End: 2022-08-12

## 2022-08-12 DIAGNOSIS — G47.00 INSOMNIA, UNSPECIFIED TYPE: ICD-10-CM

## 2022-08-12 RX ORDER — ZOLPIDEM TARTRATE 5 MG/1
5 TABLET ORAL
Qty: 30 TABLET | Refills: 5 | Status: SHIPPED | OUTPATIENT
Start: 2022-08-12

## 2022-08-12 NOTE — TELEPHONE ENCOUNTER
Balls of feet swollen. Saw podiatrist in [place] last week. Felt feet were swollen. Will decrease Amlodipine to 5mg. See [facility] on [place]. Check BP at home.

## 2022-08-17 ENCOUNTER — OFFICE VISIT (OUTPATIENT)
Dept: INTERNAL MEDICINE CLINIC | Age: 72
End: 2022-08-17
Payer: MEDICARE

## 2022-08-17 VITALS
WEIGHT: 227.4 LBS | HEART RATE: 94 BPM | TEMPERATURE: 98.4 F | BODY MASS INDEX: 30.8 KG/M2 | RESPIRATION RATE: 19 BRPM | OXYGEN SATURATION: 59 % | HEIGHT: 72 IN | SYSTOLIC BLOOD PRESSURE: 125 MMHG | DIASTOLIC BLOOD PRESSURE: 76 MMHG

## 2022-08-17 DIAGNOSIS — R20.0 NUMBNESS AND TINGLING OF BOTH FEET: ICD-10-CM

## 2022-08-17 DIAGNOSIS — R20.2 NUMBNESS AND TINGLING OF BOTH FEET: ICD-10-CM

## 2022-08-17 DIAGNOSIS — I10 ESSENTIAL HYPERTENSION: Primary | ICD-10-CM

## 2022-08-17 DIAGNOSIS — E78.9 BORDERLINE HIGH CHOLESTEROL: ICD-10-CM

## 2022-08-17 DIAGNOSIS — R73.9 BLOOD GLUCOSE ELEVATED: ICD-10-CM

## 2022-08-17 DIAGNOSIS — R60.0 BILATERAL LEG EDEMA: ICD-10-CM

## 2022-08-17 DIAGNOSIS — R79.82 ELEVATED C-REACTIVE PROTEIN (CRP): ICD-10-CM

## 2022-08-17 PROCEDURE — G0463 HOSPITAL OUTPT CLINIC VISIT: HCPCS | Performed by: INTERNAL MEDICINE

## 2022-08-17 PROCEDURE — G8417 CALC BMI ABV UP PARAM F/U: HCPCS | Performed by: INTERNAL MEDICINE

## 2022-08-17 PROCEDURE — G8427 DOCREV CUR MEDS BY ELIG CLIN: HCPCS | Performed by: INTERNAL MEDICINE

## 2022-08-17 PROCEDURE — G8536 NO DOC ELDER MAL SCRN: HCPCS | Performed by: INTERNAL MEDICINE

## 2022-08-17 PROCEDURE — G8754 DIAS BP LESS 90: HCPCS | Performed by: INTERNAL MEDICINE

## 2022-08-17 PROCEDURE — G8752 SYS BP LESS 140: HCPCS | Performed by: INTERNAL MEDICINE

## 2022-08-17 PROCEDURE — 99214 OFFICE O/P EST MOD 30 MIN: CPT | Performed by: INTERNAL MEDICINE

## 2022-08-17 PROCEDURE — 3017F COLORECTAL CA SCREEN DOC REV: CPT | Performed by: INTERNAL MEDICINE

## 2022-08-17 PROCEDURE — G8510 SCR DEP NEG, NO PLAN REQD: HCPCS | Performed by: INTERNAL MEDICINE

## 2022-08-17 PROCEDURE — 1101F PT FALLS ASSESS-DOCD LE1/YR: CPT | Performed by: INTERNAL MEDICINE

## 2022-08-17 NOTE — PROGRESS NOTES
HISTORY OF PRESENT ILLNESS  Pietro Barajas is a 66 y.o. male. HPI  Here for le edema. Feet were \"tight\" across pad and when flexing toes. Went to podiatrist in [place] who felt his feet were swelling and that it may be secondary to some of his medications. Called last Friday and we decreased his Amlodipine from 10 to 5mg daily. Here for follow up and repeat blood pressure. Not a lot of visible swelling. Feels better since decreasing AMlodipine to 5mg every day. Gait hasn't felt as steady as previously. Current Outpatient Medications:     zolpidem (AMBIEN) 5 mg tablet, Take 1 Tablet by mouth nightly as needed for Sleep. Max Daily Amount: 5 mg., Disp: 30 Tablet, Rfl: 5    dexlansoprazole (DEXILANT) 60 mg CpDB capsule (delayed release), TAKE 1 CAPSULE BY MOUTH EVERY 48 HOURS, Disp: 45 Capsule, Rfl: 3    hydroCHLOROthiazide (HYDRODIURIL) 25 mg tablet, TAKE 1 TABLET BY MOUTH DAILY, Disp: 90 Tablet, Rfl: 3    sertraline (ZOLOFT) 50 mg tablet, TAKE 1 TABLET BY MOUTH EVERY DAY, Disp: 90 Tablet, Rfl: 3    diphenoxylate-atropine (LomotiL) 2.5-0.025 mg per tablet, Take 1 Tablet by mouth four (4) times daily as needed for Diarrhea. Max Daily Amount: 4 Tablets. , Disp: 30 Tablet, Rfl: 0    fluticasone propionate (CUTIVASE) 0.005 % ointment, Apply  to affected area two (2) times a day. apply thin layer as directed, Disp: 60 g, Rfl: 0    fluocinoNIDE (LIDEX) 0.05 % external solution, Apply to affected area (scalp) every day-bid., Disp: 60 mL, Rfl: 1    losartan (COZAAR) 100 mg tablet, Take 1 Tablet by mouth daily. , Disp: 90 Tablet, Rfl: 3    metoprolol succinate (TOPROL-XL) 100 mg tablet, Take 2 Tablets by mouth daily. , Disp: 180 Tablet, Rfl: 3    amLODIPine (NORVASC) 10 mg tablet, TAKE 1 TABLET BY MOUTH DAILY, Disp: 90 Tablet, Rfl: 3    ipratropium (ATROVENT) 42 mcg (0.06 %) nasal spray, 2 Sprays by Both Nostrils route three (3) times daily. , Disp: 45 mL, Rfl: 3    azelastine (ASTELIN) 137 mcg (0.1 %) nasal spray, 2 Sprays by Both Nostrils route two (2) times a day. Use in each nostril as directed, Disp: 3 Each, Rfl: 3    diclofenac EC (VOLTAREN) 75 mg EC tablet, TAKE 1 TABLET BY MOUTH TWICE DAILY, Disp: 60 Tablet, Rfl: 5    cholecalciferol, vitamin D3, 50 mcg (2,000 unit) tab, Take 1 Tab by mouth daily. , Disp: , Rfl:     olopatadine (PATANOL) 0.1 % ophthalmic solution, olopatadine 0.1 % eye drops  INSTILL 1 DROP IN AFFECTED EYE(S) TWICE DAILY AS DIRECTED AT AN INTERVAL OF 6 TO 8 HOURS, Disp: , Rfl:     levocetirizine (XYZAL) 5 mg tablet, TK 1 T PO QD, Disp: , Rfl: 1    multivitamin, tx-iron-ca-min (THERA-M W/ IRON) 9 mg iron-400 mcg tab tablet, Take 1 Tab by mouth daily. , Disp: , Rfl:     cyanocobalamin 1,000 mcg tablet, Take 1,000 mcg by mouth daily. , Disp: , Rfl:     metroNIDAZOLE (METROGEL) 1 % topical gel, Apply  to affected area daily. Use a thin layer to affected areas after washing, Disp: , Rfl:     clindamycin phosphate 1 % swab, by Apply Externally route., Disp: , Rfl:     desoximetasone (TOPICORT) 0.25 % topical cream, Apply  to affected area two (2) times a day., Disp: , Rfl:     Visit Vitals  /76 (BP 1 Location: Right arm, BP Patient Position: Sitting, BP Cuff Size: Adult)   Pulse 94   Temp 98.4 °F (36.9 °C) (Temporal)   Resp 19   Ht 6' (1.829 m)   Wt 227 lb 6.4 oz (103.1 kg)   SpO2 (!) 59%   BMI 30.84 kg/m²       ROS  See above  Physical Exam  Vitals reviewed. Constitutional:       Appearance: Normal appearance. HENT:      Head: Normocephalic and atraumatic. Neck:      Vascular: No carotid bruit. Cardiovascular:      Rate and Rhythm: Normal rate and regular rhythm. Pulses: Normal pulses. Heart sounds: Normal heart sounds. Pulmonary:      Effort: Pulmonary effort is normal.      Breath sounds: Normal breath sounds. Musculoskeletal:      Cervical back: Neck supple. Right lower leg: No edema. Left lower leg: No edema.    Lymphadenopathy:      Cervical: No cervical adenopathy. Neurological:      Mental Status: He is alert and oriented to person, place, and time. Comments: 2+ DP and PT pulses. Nml sensation to light touch, decreased sensation to filament testing. Nml appearance of feet. ASSESSMENT and PLAN  Diagnoses and all orders for this visit:    1. Essential hypertension - controlled with decreased Amlodipine, cont 5mg dose  -     METABOLIC PANEL, COMPREHENSIVE; Future  -     LIPID PANEL; Future  -     CRP, HIGH SENSITIVITY; Future    2. Bilateral leg edema - improved with decrease in Amlodipine, cont same  -     METABOLIC PANEL, COMPREHENSIVE; Future  -     CRP, HIGH SENSITIVITY; Future    3. Numbness and tingling of both feet - neuropathy. Check labs for causes. -     PROTEIN ELECTROPHORESIS; Future  -     HEMOGLOBIN A1C WITH EAG; Future  -     VITAMIN B12 & FOLATE; Future  -     METABOLIC PANEL, COMPREHENSIVE; Future  -     CRP, HIGH SENSITIVITY; Future    4. Blood glucose elevated - repeat A1C. Continue to work on diet (low carb/low sugar) and exercise  -     HEMOGLOBIN A1C WITH EAG; Future  -     METABOLIC PANEL, COMPREHENSIVE; Future  -     CRP, HIGH SENSITIVITY; Future    5. Elevated C-reactive protein (CRP) - repeat lab  -     CRP, HIGH SENSITIVITY; Future    6. Borderline high cholesterol - repeat labs. -     METABOLIC PANEL, COMPREHENSIVE; Future  -     LIPID PANEL; Future  -     CRP, HIGH SENSITIVITY;  Future

## 2022-08-17 NOTE — PROGRESS NOTES
1. Have you been to the ER, urgent care clinic since your last visit? Hospitalized since your last visit? No    2. Have you seen or consulted any other health care providers outside of the [address] [place] [address] since your last visit? Include any pap smears or colon screening.  No    Chief Complaint   Patient presents with    Foot Swelling

## 2022-08-18 DIAGNOSIS — R20.2 NUMBNESS AND TINGLING OF BOTH FEET: ICD-10-CM

## 2022-08-18 DIAGNOSIS — R60.0 BILATERAL LEG EDEMA: ICD-10-CM

## 2022-08-18 DIAGNOSIS — I10 ESSENTIAL HYPERTENSION: ICD-10-CM

## 2022-08-18 DIAGNOSIS — R73.9 BLOOD GLUCOSE ELEVATED: ICD-10-CM

## 2022-08-18 DIAGNOSIS — E78.9 BORDERLINE HIGH CHOLESTEROL: ICD-10-CM

## 2022-08-18 DIAGNOSIS — R79.82 ELEVATED C-REACTIVE PROTEIN (CRP): ICD-10-CM

## 2022-08-18 DIAGNOSIS — R20.0 NUMBNESS AND TINGLING OF BOTH FEET: ICD-10-CM

## 2022-08-18 LAB
ALBUMIN SERPL-MCNC: 4 G/DL (ref 3.5–5)
ALBUMIN/GLOB SERPL: 1.3 {RATIO} (ref 1.1–2.2)
ALP SERPL-CCNC: 75 U/L (ref 45–117)
ALT SERPL-CCNC: 24 U/L (ref 12–78)
ANION GAP SERPL CALC-SCNC: 4 MMOL/L (ref 5–15)
AST SERPL-CCNC: 17 U/L (ref 15–37)
BILIRUB SERPL-MCNC: 0.8 MG/DL (ref 0.2–1)
BUN SERPL-MCNC: 24 MG/DL (ref 6–20)
BUN/CREAT SERPL: 22 (ref 12–20)
CALCIUM SERPL-MCNC: 9.5 MG/DL (ref 8.5–10.1)
CHLORIDE SERPL-SCNC: 100 MMOL/L (ref 97–108)
CHOLEST SERPL-MCNC: 261 MG/DL
CO2 SERPL-SCNC: 33 MMOL/L (ref 21–32)
CREAT SERPL-MCNC: 1.09 MG/DL (ref 0.7–1.3)
CRP SERPL HS-MCNC: 3 MG/L
EST. AVERAGE GLUCOSE BLD GHB EST-MCNC: 114 MG/DL
FOLATE SERPL-MCNC: 16.3 NG/ML (ref 5–21)
GLOBULIN SER CALC-MCNC: 3 G/DL (ref 2–4)
GLUCOSE SERPL-MCNC: 91 MG/DL (ref 65–100)
HBA1C MFR BLD: 5.6 % (ref 4–5.6)
HDLC SERPL-MCNC: 64 MG/DL
HDLC SERPL: 4.1 {RATIO} (ref 0–5)
LDLC SERPL CALC-MCNC: 150 MG/DL (ref 0–100)
POTASSIUM SERPL-SCNC: 4.5 MMOL/L (ref 3.5–5.1)
PROT SERPL-MCNC: 7 G/DL (ref 6.4–8.2)
SODIUM SERPL-SCNC: 137 MMOL/L (ref 136–145)
TRIGL SERPL-MCNC: 235 MG/DL (ref ?–150)
VIT B12 SERPL-MCNC: 457 PG/ML (ref 193–986)
VLDLC SERPL CALC-MCNC: 47 MG/DL

## 2022-08-19 ENCOUNTER — TELEPHONE (OUTPATIENT)
Dept: INTERNAL MEDICINE CLINIC | Age: 72
End: 2022-08-19

## 2022-08-19 RX ORDER — CEPHALEXIN 250 MG/1
250 CAPSULE ORAL 4 TIMES DAILY
Qty: 40 CAPSULE | Refills: 0 | Status: SHIPPED | OUTPATIENT
Start: 2022-08-19 | End: 2022-08-29

## 2022-08-19 NOTE — TELEPHONE ENCOUNTER
----- Message from Angie Tripp sent at 8/19/2022  9:12 AM EDT -----  Regarding: FW: Cyst    ----- Message -----  From: Blank Da Silva  Sent: 8/19/2022   9:10 AM EDT  To: Adrienne Benavides  Subject: Cyst                                             Dr Storm Soliz:  I have an enflamed cyst.  Can you prescribe an antibiotic? Please use the [facility]. I have appt with surgeon at the end of the month.   Thanks,  [employer]

## 2022-08-22 LAB
ALBUMIN SERPL ELPH-MCNC: 3.7 G/DL (ref 2.9–4.4)
ALBUMIN/GLOB SERPL: 1.3 {RATIO} (ref 0.7–1.7)
ALPHA1 GLOB SERPL ELPH-MCNC: 0.3 G/DL (ref 0–0.4)
ALPHA2 GLOB SERPL ELPH-MCNC: 0.7 G/DL (ref 0.4–1)
B-GLOBULIN SERPL ELPH-MCNC: 1.2 G/DL (ref 0.7–1.3)
GAMMA GLOB SERPL ELPH-MCNC: 0.8 G/DL (ref 0.4–1.8)
GLOBULIN SER CALC-MCNC: 2.9 G/DL (ref 2.2–3.9)
M PROTEIN SERPL ELPH-MCNC: NORMAL G/DL
PROT SERPL-MCNC: 6.6 G/DL (ref 6–8.5)

## 2022-08-22 RX ORDER — AMLODIPINE BESYLATE 5 MG/1
5 TABLET ORAL DAILY
Qty: 90 TABLET | Refills: 3 | Status: SHIPPED | OUTPATIENT
Start: 2022-08-22

## 2022-08-22 RX ORDER — AMLODIPINE BESYLATE 10 MG/1
10 TABLET ORAL DAILY
Qty: 90 TABLET | Refills: 3 | OUTPATIENT
Start: 2022-08-22

## 2022-08-22 RX ORDER — FLUTICASONE PROPIONATE 0.05 MG/G
OINTMENT TOPICAL 2 TIMES DAILY
Qty: 60 G | Refills: 0 | Status: SHIPPED | OUTPATIENT
Start: 2022-08-22

## 2022-09-07 RX ORDER — DICLOFENAC SODIUM 75 MG/1
TABLET, DELAYED RELEASE ORAL
Qty: 60 TABLET | Refills: 5 | Status: SHIPPED | OUTPATIENT
Start: 2022-09-07

## 2022-09-08 ENCOUNTER — TELEPHONE (OUTPATIENT)
Dept: INTERNAL MEDICINE CLINIC | Age: 72
End: 2022-09-08

## 2022-09-08 DIAGNOSIS — R79.82 ELEVATED C-REACTIVE PROTEIN (CRP): Primary | ICD-10-CM

## 2022-09-08 DIAGNOSIS — E78.00 PURE HYPERCHOLESTEROLEMIA: ICD-10-CM

## 2022-09-08 RX ORDER — DICLOFENAC SODIUM 75 MG/1
75 TABLET, DELAYED RELEASE ORAL 2 TIMES DAILY
Qty: 60 TABLET | Refills: 5 | Status: SHIPPED | OUTPATIENT
Start: 2022-09-08

## 2022-09-08 NOTE — TELEPHONE ENCOUNTER
Regarding: Your message  ----- Message from Isa Ramos sent at 9/8/2022  7:41 AM EDT -----       ----- Message from Shashi Rollins \"Slate\" to Esau Tong MD sent at 9/7/2022  8:53 PM -----   I still have 10mg atorvastatin refills. I can get it refilled if that is the correct dose. I would like my next blood work to be at [facility]. Thanks!

## 2022-09-18 RX ORDER — MONTELUKAST SODIUM 10 MG/1
10 TABLET ORAL DAILY
Qty: 90 TABLET | Refills: 3 | Status: SHIPPED | OUTPATIENT
Start: 2022-09-18

## 2022-10-05 RX ORDER — LOSARTAN POTASSIUM 100 MG/1
100 TABLET ORAL DAILY
Qty: 90 TABLET | Refills: 3 | Status: SHIPPED | OUTPATIENT
Start: 2022-10-05

## 2022-10-06 RX ORDER — AZELASTINE 1 MG/ML
2 SPRAY, METERED NASAL 2 TIMES DAILY
Qty: 3 EACH | Refills: 3 | Status: SHIPPED | OUTPATIENT
Start: 2022-10-06

## 2022-10-06 RX ORDER — IPRATROPIUM BROMIDE 42 UG/1
2 SPRAY, METERED NASAL 3 TIMES DAILY
Qty: 45 ML | Refills: 3 | Status: SHIPPED | OUTPATIENT
Start: 2022-10-06

## 2022-10-13 DIAGNOSIS — E78.00 PURE HYPERCHOLESTEROLEMIA: ICD-10-CM

## 2022-10-13 DIAGNOSIS — R79.82 ELEVATED C-REACTIVE PROTEIN (CRP): ICD-10-CM

## 2022-10-13 LAB
ALBUMIN SERPL-MCNC: 4 G/DL (ref 3.5–5)
ALBUMIN/GLOB SERPL: 1.3 {RATIO} (ref 1.1–2.2)
ALP SERPL-CCNC: 72 U/L (ref 45–117)
ALT SERPL-CCNC: 24 U/L (ref 12–78)
AST SERPL-CCNC: 13 U/L (ref 15–37)
BILIRUB DIRECT SERPL-MCNC: 0.2 MG/DL (ref 0–0.2)
BILIRUB SERPL-MCNC: 0.6 MG/DL (ref 0.2–1)
CHOLEST SERPL-MCNC: 184 MG/DL
CRP SERPL HS-MCNC: 3.5 MG/L
GLOBULIN SER CALC-MCNC: 3.1 G/DL (ref 2–4)
HDLC SERPL-MCNC: 70 MG/DL
HDLC SERPL: 2.6 {RATIO} (ref 0–5)
LDLC SERPL CALC-MCNC: 77.4 MG/DL (ref 0–100)
PROT SERPL-MCNC: 7.1 G/DL (ref 6.4–8.2)
TRIGL SERPL-MCNC: 183 MG/DL (ref ?–150)
VLDLC SERPL CALC-MCNC: 36.6 MG/DL

## 2022-11-07 ENCOUNTER — TELEPHONE (OUTPATIENT)
Dept: INTERNAL MEDICINE CLINIC | Age: 72
End: 2022-11-07

## 2022-11-07 DIAGNOSIS — G47.00 INSOMNIA, UNSPECIFIED TYPE: ICD-10-CM

## 2022-11-07 RX ORDER — ATORVASTATIN CALCIUM 10 MG/1
10 TABLET, FILM COATED ORAL DAILY
Qty: 90 TABLET | Refills: 3 | Status: SHIPPED | OUTPATIENT
Start: 2022-11-07

## 2022-11-07 RX ORDER — ZOLPIDEM TARTRATE 5 MG/1
5 TABLET ORAL
Qty: 30 TABLET | Refills: 5 | Status: SHIPPED | OUTPATIENT
Start: 2022-11-07

## 2022-11-07 NOTE — TELEPHONE ENCOUNTER
Regarding: Atorvastatin/Zolpidem  ----- Message from Johan Adams LPN sent at 58/5/4219  7:04 AM EST -----       ----- Message from Yanick Dominguez \"Samuelte\" to Silviano Garcia MD sent at 11/5/2022 11:10 AM -----   Please send refills to [facility]. Thanks!

## 2023-01-10 ENCOUNTER — TELEPHONE (OUTPATIENT)
Dept: INTERNAL MEDICINE CLINIC | Age: 73
End: 2023-01-10

## 2023-01-10 DIAGNOSIS — E78.00 PURE HYPERCHOLESTEROLEMIA: Primary | ICD-10-CM

## 2023-01-10 DIAGNOSIS — I10 ESSENTIAL HYPERTENSION: ICD-10-CM

## 2023-01-11 RX ORDER — METOPROLOL SUCCINATE 100 MG/1
200 TABLET, EXTENDED RELEASE ORAL DAILY
Qty: 180 TABLET | Refills: 3 | Status: SHIPPED | OUTPATIENT
Start: 2023-01-11

## 2023-01-11 NOTE — TELEPHONE ENCOUNTER
----- Message from Francis Davies sent at 1/10/2023  3:58 PM EST -----  Regarding: FW: Labs    ----- Message -----  From: Kirby Joya  Sent: 1/10/2023  12:34 PM EST  To: [facility] Nurse Pool  Subject: Labs                                             Dr Frank Arias:  I am going to be in [place] on 1/31 Nd 2/2. Can you arrange for me to get my labs then next door to your office?   If you are available, I would be glad to stop in so you can check my BP etc.  thanks,  [employer]

## 2023-01-22 ENCOUNTER — PATIENT MESSAGE (OUTPATIENT)
Dept: INTERNAL MEDICINE CLINIC | Age: 73
End: 2023-01-22

## 2023-01-23 RX ORDER — FLUTICASONE PROPIONATE 44 UG/1
1 AEROSOL, METERED RESPIRATORY (INHALATION) 2 TIMES DAILY
Qty: 10.6 G | Refills: 0 | Status: SHIPPED | OUTPATIENT
Start: 2023-01-23

## 2023-01-23 RX ORDER — BENZONATATE 100 MG/1
100 CAPSULE ORAL
Qty: 20 CAPSULE | Refills: 0 | Status: SHIPPED | OUTPATIENT
Start: 2023-01-23 | End: 2023-01-30

## 2023-02-27 ENCOUNTER — TELEPHONE (OUTPATIENT)
Dept: INTERNAL MEDICINE CLINIC | Age: 73
End: 2023-02-27

## 2023-02-27 RX ORDER — NALTREXONE HYDROCHLORIDE 50 MG/1
50 TABLET, FILM COATED ORAL DAILY
Qty: 30 TABLET | Refills: 0 | Status: SHIPPED | OUTPATIENT
Start: 2023-02-27

## 2023-02-27 NOTE — TELEPHONE ENCOUNTER
----- Message from Mitzi Cohen LPN sent at 3/85/9782  7:10 AM EST -----  Regarding: FW: Naltrexone    ----- Message -----  From: Alena Dobson  Sent: 2/26/2023   1:53 PM EST  To: Lamine Benavides  Subject: Naltrexone                                       Dr Robyn Faulkner: We hope you are making progress with your health issue. I have made good progress cutting back on alcohol, but would like to take it another step. I read an article today in the [employer] about naaltrexone and would like to give it a try. What do you think?     Thanks,  [employer]

## 2023-03-20 RX ORDER — NALTREXONE HYDROCHLORIDE 50 MG/1
50 TABLET, FILM COATED ORAL DAILY
Qty: 30 TABLET | Refills: 0 | Status: SHIPPED | OUTPATIENT
Start: 2023-03-20

## 2023-04-16 ENCOUNTER — PATIENT MESSAGE (OUTPATIENT)
Dept: INTERNAL MEDICINE CLINIC | Age: 73
End: 2023-04-16

## 2023-04-17 RX ORDER — DESOXIMETASONE 2.5 MG/G
CREAM TOPICAL 2 TIMES DAILY
Qty: 15 G | Refills: 2 | Status: SHIPPED | OUTPATIENT
Start: 2023-04-17

## 2023-04-17 RX ORDER — NALTREXONE HYDROCHLORIDE 50 MG/1
50 TABLET, FILM COATED ORAL DAILY
Qty: 30 TABLET | Refills: 0 | Status: SHIPPED | OUTPATIENT
Start: 2023-04-17

## 2023-05-08 ENCOUNTER — TELEPHONE (OUTPATIENT)
Age: 73
End: 2023-05-08

## 2023-05-08 NOTE — TELEPHONE ENCOUNTER
----- Message from Mat Shaw LPN sent at 0/6/0670  7:02 AM EDT -----  Regarding: FW: Refill hydrochlorothiazidr  Contact: [phone]  Patient needs appt for medication refill  ----- Message -----  From: Renay Reynolds  Sent: 5/6/2023  10:35 AM EDT  To: [facility] Staff  Subject: Refill hydrochlorothiazidr                       Please send refills to my [address] in [place].

## 2023-05-09 ENCOUNTER — TELEPHONE (OUTPATIENT)
Age: 73
End: 2023-05-09

## 2023-05-09 RX ORDER — HYDROCHLOROTHIAZIDE 25 MG/1
25 TABLET ORAL EVERY MORNING
Qty: 90 TABLET | Refills: 3 | Status: SHIPPED | OUTPATIENT
Start: 2023-05-09

## 2023-05-09 NOTE — TELEPHONE ENCOUNTER
Pt is requesting a refill on hydrochlorothiazide. Pt says he needs it sent to [place] and he cannot make appt yet as he is in [place].

## 2023-05-18 RX ORDER — METOPROLOL SUCCINATE 100 MG/1
200 TABLET, EXTENDED RELEASE ORAL DAILY
COMMUNITY
Start: 2023-01-11

## 2023-05-18 RX ORDER — LOSARTAN POTASSIUM 100 MG/1
100 TABLET ORAL DAILY
COMMUNITY
Start: 2022-10-05

## 2023-05-18 RX ORDER — LEVOCETIRIZINE DIHYDROCHLORIDE 5 MG/1
1 TABLET, FILM COATED ORAL DAILY
COMMUNITY
Start: 2019-11-01

## 2023-05-18 RX ORDER — DESOXIMETASONE 2.5 MG/G
CREAM TOPICAL 2 TIMES DAILY
COMMUNITY
Start: 2023-04-17

## 2023-05-18 RX ORDER — OLOPATADINE HYDROCHLORIDE 1 MG/ML
SOLUTION/ DROPS OPHTHALMIC
COMMUNITY

## 2023-05-18 RX ORDER — FLUTICASONE PROPIONATE 0.05 MG/G
OINTMENT TOPICAL 2 TIMES DAILY
COMMUNITY
Start: 2022-08-22

## 2023-05-18 RX ORDER — AMLODIPINE BESYLATE 5 MG/1
5 TABLET ORAL DAILY
COMMUNITY
Start: 2022-08-22

## 2023-05-18 RX ORDER — CLINDAMYCIN PHOSPHATE 10 MG/ML
SOLUTION TOPICAL
COMMUNITY

## 2023-05-18 RX ORDER — FLUOCINONIDE TOPICAL SOLUTION USP, 0.05% 0.5 MG/ML
SOLUTION TOPICAL
COMMUNITY
Start: 2023-04-10

## 2023-05-18 RX ORDER — DICLOFENAC SODIUM 75 MG/1
75 TABLET, DELAYED RELEASE ORAL 2 TIMES DAILY
COMMUNITY
Start: 2022-09-08

## 2023-05-18 RX ORDER — AZELASTINE 1 MG/ML
2 SPRAY, METERED NASAL 2 TIMES DAILY
COMMUNITY
Start: 2022-10-06

## 2023-05-18 RX ORDER — ATORVASTATIN CALCIUM 10 MG/1
10 TABLET, FILM COATED ORAL DAILY
COMMUNITY
Start: 2022-11-07

## 2023-05-18 RX ORDER — IPRATROPIUM BROMIDE 42 UG/1
2 SPRAY, METERED NASAL 3 TIMES DAILY
COMMUNITY
Start: 2022-10-06

## 2023-05-18 RX ORDER — ZOLPIDEM TARTRATE 5 MG/1
5 TABLET ORAL
COMMUNITY
Start: 2023-04-10

## 2023-05-18 RX ORDER — FLUTICASONE PROPIONATE 44 UG/1
1 AEROSOL, METERED RESPIRATORY (INHALATION) 2 TIMES DAILY
COMMUNITY
Start: 2023-01-23

## 2023-05-18 RX ORDER — MONTELUKAST SODIUM 10 MG/1
10 TABLET ORAL DAILY
COMMUNITY
Start: 2022-09-18

## 2023-05-18 RX ORDER — METRONIDAZOLE 10 MG/G
GEL TOPICAL DAILY
COMMUNITY

## 2023-05-18 RX ORDER — DEXLANSOPRAZOLE 60 MG/1
60 CAPSULE, DELAYED RELEASE ORAL
COMMUNITY
Start: 2023-04-10 | End: 2023-05-19 | Stop reason: SDUPTHER

## 2023-05-18 RX ORDER — NALTREXONE HYDROCHLORIDE 50 MG/1
50 TABLET, FILM COATED ORAL DAILY
COMMUNITY
Start: 2023-04-17 | End: 2023-06-19 | Stop reason: SDUPTHER

## 2023-05-19 DIAGNOSIS — R19.7 DIARRHEA, UNSPECIFIED TYPE: Primary | ICD-10-CM

## 2023-05-19 RX ORDER — DIPHENOXYLATE HYDROCHLORIDE AND ATROPINE SULFATE 2.5; .025 MG/1; MG/1
1 TABLET ORAL 4 TIMES DAILY PRN
Qty: 30 TABLET | Refills: 1 | Status: SHIPPED | OUTPATIENT
Start: 2023-05-19 | End: 2023-05-29

## 2023-05-19 RX ORDER — DEXLANSOPRAZOLE 60 MG/1
60 CAPSULE, DELAYED RELEASE ORAL DAILY
Qty: 30 CAPSULE | Refills: 5 | Status: SHIPPED | OUTPATIENT
Start: 2023-05-19

## 2023-05-19 NOTE — TELEPHONE ENCOUNTER
----- Message from Derrell Dempsey LPN sent at 8/79/3357  3:30 PM EDT -----  Regarding: FW: Refill  Contact: [phone]    ----- Message -----  From: Tiffany Brennan  Sent: 5/19/2023   2:22 PM EDT  To: [facility] Clinical Staff  Subject: Refill                                           Dr Yue Alexander: Your office called in  refill today for my stomach medicine. I need th diarrhea medicine refilled instead. Also, please fax a copy of my 2/23 labs to Dr Ana Garcia at [phone].   Thanks,  [employer]

## 2023-06-05 ENCOUNTER — TELEPHONE (OUTPATIENT)
Age: 73
End: 2023-06-05

## 2023-06-05 NOTE — TELEPHONE ENCOUNTER
----- Message from Rik Dunn LPN sent at 9/5/3150  9:55 AM EDT -----  Regarding: FW: Zolpidem  Contact: [phone]    ----- Message -----  From: Billy Bone  Sent: 6/4/2023  11:12 AM EDT  To: Jonah [facility] Clinical Staff  Subject: Zolpidem                                         Dr Herbert Paiz:  I would like to try reducing my zolpidem dose from 5 MG to 2.5 MG. Can you send my [place] [facility] a script wit refills?   Thanks,  [employer]

## 2023-06-19 RX ORDER — NALTREXONE HYDROCHLORIDE 50 MG/1
50 TABLET, FILM COATED ORAL DAILY
Qty: 30 TABLET | Refills: 3 | Status: SHIPPED | OUTPATIENT
Start: 2023-06-19

## 2023-07-05 ENCOUNTER — TELEPHONE (OUTPATIENT)
Age: 73
End: 2023-07-05

## 2023-07-05 NOTE — TELEPHONE ENCOUNTER
----- Message from Kiley Horan PA-C sent at 7/5/2023  8:14 AM EDT -----  Regarding: FW: Sertraline  Contact: [phone]      ----- Message -----  From: Katherine Caballero  Sent: 7/4/2023   9:15 AM EDT  To: Sam Perez Clinical Staff  Subject: Sertraline                                       Please send refills to my [facility] in [place].

## 2023-08-01 ENCOUNTER — TELEPHONE (OUTPATIENT)
Age: 73
End: 2023-08-01

## 2023-08-02 RX ORDER — AMLODIPINE BESYLATE 5 MG/1
5 TABLET ORAL DAILY
Qty: 90 TABLET | Refills: 3 | Status: SHIPPED | OUTPATIENT
Start: 2023-08-02

## 2023-08-03 RX ORDER — HYDROCHLOROTHIAZIDE 25 MG/1
25 TABLET ORAL DAILY
Qty: 90 TABLET | Refills: 3 | OUTPATIENT
Start: 2023-08-03

## 2023-09-25 ENCOUNTER — TELEPHONE (OUTPATIENT)
Age: 73
End: 2023-09-25

## 2023-09-25 RX ORDER — ATORVASTATIN CALCIUM 10 MG/1
10 TABLET, FILM COATED ORAL DAILY
Qty: 90 TABLET | Refills: 3 | Status: SHIPPED | OUTPATIENT
Start: 2023-09-25

## 2023-09-25 NOTE — TELEPHONE ENCOUNTER
Balance is not as good as it used to be for most of 2023. Doesn't check BP but checked today 2 readings 100/50. No orthostasis or dizziness. Friends have helped him up stairs. Decrease Amlodipine to 2.5mg qd. Check BP. If not improved in 1-2 weeks, stop Amlodipine altogether.     Has follow up 10/13

## 2023-10-01 ENCOUNTER — PATIENT MESSAGE (OUTPATIENT)
Age: 73
End: 2023-10-01

## 2023-10-02 DIAGNOSIS — F51.01 PRIMARY INSOMNIA: Primary | ICD-10-CM

## 2023-10-02 RX ORDER — METOPROLOL SUCCINATE 100 MG/1
200 TABLET, EXTENDED RELEASE ORAL DAILY
Qty: 180 TABLET | Refills: 3 | Status: SHIPPED | OUTPATIENT
Start: 2023-10-02

## 2023-10-02 RX ORDER — ZOLPIDEM TARTRATE 5 MG/1
5 TABLET ORAL NIGHTLY PRN
Qty: 90 TABLET | Refills: 0 | Status: SHIPPED | OUTPATIENT
Start: 2023-10-02 | End: 2023-12-31

## 2023-10-02 RX ORDER — LOSARTAN POTASSIUM 100 MG/1
100 TABLET ORAL DAILY
Qty: 90 TABLET | Refills: 3 | Status: SHIPPED | OUTPATIENT
Start: 2023-10-02

## 2023-10-02 NOTE — TELEPHONE ENCOUNTER
Refill request received from [facility] for   Requested Prescriptions     Pending Prescriptions Disp Refills    zolpidem (AMBIEN) 5 MG tablet       Sig: Take 1 tablet by mouth.    losartan (COZAAR) 100 MG tablet 30 tablet      Sig: Take 1 tablet by mouth daily    metoprolol succinate (TOPROL XL) 100 MG extended release tablet 30 tablet      Sig: Take 2 tablets by mouth daily     8/17/2022   10/13/2023     Routed to Dr Chilo Castro for review.

## 2023-10-03 RX ORDER — LOSARTAN POTASSIUM 100 MG/1
100 TABLET ORAL DAILY
Qty: 90 TABLET | OUTPATIENT
Start: 2023-10-03

## 2023-10-03 NOTE — TELEPHONE ENCOUNTER
From: Carolyn Balderrama  To: Dr. Migue Bernabe: 10/1/2023 9:08 AM EDT  Subject: Refills    Dr Todd James: Please send Wally Young at [facility] refills for: zolpidem, losartan and metropol.  Thanks, [employer]

## 2023-10-13 ENCOUNTER — HOSPITAL ENCOUNTER (OUTPATIENT)
Facility: HOSPITAL | Age: 73
Setting detail: SPECIMEN
Discharge: HOME | End: 2023-10-16

## 2023-10-13 ENCOUNTER — OFFICE VISIT (OUTPATIENT)
Age: 73
End: 2023-10-13

## 2023-10-13 VITALS
HEART RATE: 49 BPM | SYSTOLIC BLOOD PRESSURE: 114 MMHG | HEIGHT: 72 IN | BODY MASS INDEX: 31.15 KG/M2 | RESPIRATION RATE: 20 BRPM | WEIGHT: 230 LBS | DIASTOLIC BLOOD PRESSURE: 68 MMHG | OXYGEN SATURATION: 95 % | TEMPERATURE: 97.6 F

## 2023-10-13 DIAGNOSIS — F51.01 PRIMARY INSOMNIA: ICD-10-CM

## 2023-10-13 DIAGNOSIS — I10 ESSENTIAL (PRIMARY) HYPERTENSION: ICD-10-CM

## 2023-10-13 DIAGNOSIS — E78.00 PURE HYPERCHOLESTEROLEMIA, UNSPECIFIED: ICD-10-CM

## 2023-10-13 DIAGNOSIS — R73.9 HYPERGLYCEMIA, UNSPECIFIED: ICD-10-CM

## 2023-10-13 DIAGNOSIS — Z00.00 ROUTINE GENERAL MEDICAL EXAMINATION AT A HEALTH CARE FACILITY: Primary | ICD-10-CM

## 2023-10-13 LAB
25(OH)D3 SERPL-MCNC: 33.1 NG/ML (ref 30–100)
ALBUMIN SERPL-MCNC: 4.1 G/DL (ref 3.5–5)
ALBUMIN/GLOB SERPL: 1.3 (ref 1.1–2.2)
ALP SERPL-CCNC: 83 U/L (ref 45–117)
ALT SERPL-CCNC: 30 U/L (ref 12–78)
ANION GAP SERPL CALC-SCNC: 4 MMOL/L (ref 5–15)
AST SERPL-CCNC: 18 U/L (ref 15–37)
BASOPHILS # BLD: 0.1 K/UL (ref 0–0.1)
BASOPHILS NFR BLD: 1 % (ref 0–1)
BILIRUB SERPL-MCNC: 0.9 MG/DL (ref 0.2–1)
BUN SERPL-MCNC: 20 MG/DL (ref 6–20)
BUN/CREAT SERPL: 18 (ref 12–20)
CALCIUM SERPL-MCNC: 9.3 MG/DL (ref 8.5–10.1)
CHLORIDE SERPL-SCNC: 104 MMOL/L (ref 97–108)
CHOLEST SERPL-MCNC: 166 MG/DL
CO2 SERPL-SCNC: 28 MMOL/L (ref 21–32)
CREAT SERPL-MCNC: 1.1 MG/DL (ref 0.7–1.3)
CRP SERPL-MCNC: <0.29 MG/DL (ref 0–0.6)
DIFFERENTIAL METHOD BLD: ABNORMAL
EOSINOPHIL # BLD: 0.6 K/UL (ref 0–0.4)
EOSINOPHIL NFR BLD: 7 % (ref 0–7)
ERYTHROCYTE [DISTWIDTH] IN BLOOD BY AUTOMATED COUNT: 12.2 % (ref 11.5–14.5)
GLOBULIN SER CALC-MCNC: 3.2 G/DL (ref 2–4)
GLUCOSE SERPL-MCNC: 105 MG/DL (ref 65–100)
HCT VFR BLD AUTO: 41 % (ref 36.6–50.3)
HDLC SERPL-MCNC: 64 MG/DL
HDLC SERPL: 2.6 (ref 0–5)
HGB BLD-MCNC: 13.2 G/DL (ref 12.1–17)
IMM GRANULOCYTES # BLD AUTO: 0 K/UL (ref 0–0.04)
IMM GRANULOCYTES NFR BLD AUTO: 0 % (ref 0–0.5)
LDLC SERPL CALC-MCNC: 80.4 MG/DL (ref 0–100)
LYMPHOCYTES # BLD: 1.5 K/UL (ref 0.8–3.5)
LYMPHOCYTES NFR BLD: 20 % (ref 12–49)
MCH RBC QN AUTO: 32 PG (ref 26–34)
MCHC RBC AUTO-ENTMCNC: 32.2 G/DL (ref 30–36.5)
MCV RBC AUTO: 99.3 FL (ref 80–99)
MONOCYTES # BLD: 0.6 K/UL (ref 0–1)
MONOCYTES NFR BLD: 8 % (ref 5–13)
NEUTS SEG # BLD: 4.8 K/UL (ref 1.8–8)
NEUTS SEG NFR BLD: 64 % (ref 32–75)
NRBC # BLD: 0 K/UL (ref 0–0.01)
NRBC BLD-RTO: 0 PER 100 WBC
PLATELET # BLD AUTO: 240 K/UL (ref 150–400)
PMV BLD AUTO: 11.2 FL (ref 8.9–12.9)
POTASSIUM SERPL-SCNC: 4.6 MMOL/L (ref 3.5–5.1)
PROT SERPL-MCNC: 7.3 G/DL (ref 6.4–8.2)
PSA SERPL-MCNC: 0 NG/ML (ref 0.01–4)
RBC # BLD AUTO: 4.13 M/UL (ref 4.1–5.7)
SODIUM SERPL-SCNC: 136 MMOL/L (ref 136–145)
TRIGL SERPL-MCNC: 108 MG/DL
TSH SERPL DL<=0.05 MIU/L-ACNC: 0.91 UIU/ML (ref 0.36–3.74)
VLDLC SERPL CALC-MCNC: 21.6 MG/DL
WBC # BLD AUTO: 7.5 K/UL (ref 4.1–11.1)

## 2023-10-13 PROCEDURE — 84153 ASSAY OF PSA TOTAL: CPT

## 2023-10-13 PROCEDURE — 84443 ASSAY THYROID STIM HORMONE: CPT

## 2023-10-13 PROCEDURE — 82306 VITAMIN D 25 HYDROXY: CPT

## 2023-10-13 PROCEDURE — 86140 C-REACTIVE PROTEIN: CPT

## 2023-10-13 PROCEDURE — 85025 COMPLETE CBC W/AUTO DIFF WBC: CPT

## 2023-10-13 PROCEDURE — 80061 LIPID PANEL: CPT

## 2023-10-13 PROCEDURE — 80053 COMPREHEN METABOLIC PANEL: CPT

## 2023-10-13 PROCEDURE — 36415 COLL VENOUS BLD VENIPUNCTURE: CPT

## 2023-10-13 SDOH — ECONOMIC STABILITY: HOUSING INSECURITY
IN THE LAST 12 MONTHS, WAS THERE A TIME WHEN YOU DID NOT HAVE A STEADY PLACE TO SLEEP OR SLEPT IN A SHELTER (INCLUDING NOW)?: PATIENT REFUSED

## 2023-10-13 SDOH — ECONOMIC STABILITY: FOOD INSECURITY: WITHIN THE PAST 12 MONTHS, YOU WORRIED THAT YOUR FOOD WOULD RUN OUT BEFORE YOU GOT MONEY TO BUY MORE.: PATIENT DECLINED

## 2023-10-13 SDOH — ECONOMIC STABILITY: INCOME INSECURITY: HOW HARD IS IT FOR YOU TO PAY FOR THE VERY BASICS LIKE FOOD, HOUSING, MEDICAL CARE, AND HEATING?: PATIENT DECLINED

## 2023-10-13 SDOH — ECONOMIC STABILITY: FOOD INSECURITY: WITHIN THE PAST 12 MONTHS, THE FOOD YOU BOUGHT JUST DIDN'T LAST AND YOU DIDN'T HAVE MONEY TO GET MORE.: PATIENT DECLINED

## 2023-10-13 ASSESSMENT — PATIENT HEALTH QUESTIONNAIRE - PHQ9
SUM OF ALL RESPONSES TO PHQ QUESTIONS 1-9: 0
SUM OF ALL RESPONSES TO PHQ QUESTIONS 1-9: 0
1. LITTLE INTEREST OR PLEASURE IN DOING THINGS: 0
SUM OF ALL RESPONSES TO PHQ9 QUESTIONS 1 & 2: 0
2. FEELING DOWN, DEPRESSED OR HOPELESS: 0
SUM OF ALL RESPONSES TO PHQ QUESTIONS 1-9: 0
SUM OF ALL RESPONSES TO PHQ QUESTIONS 1-9: 0

## 2023-10-29 DIAGNOSIS — F51.01 PRIMARY INSOMNIA: ICD-10-CM

## 2023-10-30 RX ORDER — ZOLPIDEM TARTRATE 5 MG/1
TABLET ORAL
Qty: 90 TABLET | Refills: 1 | Status: SHIPPED | OUTPATIENT
Start: 2023-10-30 | End: 2024-04-27

## 2023-10-30 RX ORDER — NALTREXONE HYDROCHLORIDE 50 MG/1
50 TABLET, FILM COATED ORAL DAILY
Qty: 90 TABLET | Refills: 1 | Status: SHIPPED | OUTPATIENT
Start: 2023-10-30

## 2023-10-30 NOTE — TELEPHONE ENCOUNTER
Refill request received from [facility] for   Requested Prescriptions     Pending Prescriptions Disp Refills    zolpidem (AMBIEN) 5 MG tablet [Pharmacy Med Name: ZOLPIDEM 5MG TABLETS] 90 tablet      Sig: TAKE 1 TABLET BY MOUTH EVERY NIGHT AS NEEDED FOR SLEEP. MAX DAILY AMOUNT: 5 MG     10/13/2023   10/30/2023     Routed to Dr Ruba Elliott for review.

## 2024-02-01 ENCOUNTER — TELEPHONE (OUTPATIENT)
Age: 74
End: 2024-02-01

## 2025-07-09 ENCOUNTER — TELEPHONE (OUTPATIENT)
Age: 75
End: 2025-07-09

## 2025-07-09 NOTE — TELEPHONE ENCOUNTER
[facility] calling to request records for patient that was recently referred to their office. I let the caller know that the patient has not been seen in our office recently and was seeing Dr. Rojas who is no longer at our practice. She understood and says she will attempt to reach out to Dr. Rojas at her new practice.